# Patient Record
Sex: FEMALE | Race: BLACK OR AFRICAN AMERICAN | NOT HISPANIC OR LATINO | Employment: OTHER | URBAN - METROPOLITAN AREA
[De-identification: names, ages, dates, MRNs, and addresses within clinical notes are randomized per-mention and may not be internally consistent; named-entity substitution may affect disease eponyms.]

---

## 2017-06-02 ENCOUNTER — HOSPITAL ENCOUNTER (EMERGENCY)
Facility: HOSPITAL | Age: 52
Discharge: HOME | End: 2017-06-02
Attending: EMERGENCY MEDICINE
Payer: MEDICAID

## 2017-06-02 VITALS
HEART RATE: 60 BPM | OXYGEN SATURATION: 95 % | TEMPERATURE: 98 F | BODY MASS INDEX: 38.8 KG/M2 | DIASTOLIC BLOOD PRESSURE: 59 MMHG | WEIGHT: 262 LBS | HEIGHT: 69 IN | SYSTOLIC BLOOD PRESSURE: 106 MMHG | RESPIRATION RATE: 18 BRPM

## 2017-06-02 DIAGNOSIS — M54.41 ACUTE RIGHT-SIDED LOW BACK PAIN WITH RIGHT-SIDED SCIATICA: Primary | ICD-10-CM

## 2017-06-02 DIAGNOSIS — M17.11 ARTHRITIS OF RIGHT KNEE: ICD-10-CM

## 2017-06-02 PROCEDURE — 63600175 PHARM REV CODE 636 W HCPCS: Performed by: EMERGENCY MEDICINE

## 2017-06-02 PROCEDURE — 96372 THER/PROPH/DIAG INJ SC/IM: CPT

## 2017-06-02 PROCEDURE — 99283 EMERGENCY DEPT VISIT LOW MDM: CPT | Mod: 25

## 2017-06-02 PROCEDURE — 99284 EMERGENCY DEPT VISIT MOD MDM: CPT | Mod: ,,, | Performed by: EMERGENCY MEDICINE

## 2017-06-02 PROCEDURE — 25000003 PHARM REV CODE 250: Performed by: EMERGENCY MEDICINE

## 2017-06-02 RX ORDER — DIAZEPAM 2 MG/1
2 TABLET ORAL
Status: COMPLETED | OUTPATIENT
Start: 2017-06-02 | End: 2017-06-02

## 2017-06-02 RX ORDER — GABAPENTIN 100 MG/1
100 CAPSULE ORAL 3 TIMES DAILY
COMMUNITY
End: 2023-07-29

## 2017-06-02 RX ORDER — MELOXICAM 15 MG/1
15 TABLET ORAL DAILY
Qty: 10 TABLET | Refills: 0 | Status: ON HOLD | OUTPATIENT
Start: 2017-06-02 | End: 2019-04-08 | Stop reason: HOSPADM

## 2017-06-02 RX ORDER — KETOROLAC TROMETHAMINE 30 MG/ML
10 INJECTION, SOLUTION INTRAMUSCULAR; INTRAVENOUS
Status: COMPLETED | OUTPATIENT
Start: 2017-06-02 | End: 2017-06-02

## 2017-06-02 RX ORDER — CYCLOBENZAPRINE HCL 10 MG
10 TABLET ORAL 3 TIMES DAILY PRN
Qty: 15 TABLET | Refills: 0 | Status: SHIPPED | OUTPATIENT
Start: 2017-06-02 | End: 2017-06-07

## 2017-06-02 RX ADMIN — DIAZEPAM 2 MG: 2 TABLET ORAL at 11:06

## 2017-06-02 RX ADMIN — KETOROLAC TROMETHAMINE 10 MG: 30 INJECTION, SOLUTION INTRAMUSCULAR at 11:06

## 2017-06-02 NOTE — ED PROVIDER NOTES
Encounter Date: 2017    SCRIBE #1 NOTE: I, Daria Terrazas, am scribing for, and in the presence of,  Dr. Farris. I have scribed the entire note.       History     Chief Complaint   Patient presents with    Knee Pain     Review of patient's allergies indicates:  No Known Allergies  Time patient was seen by the provider: 11:16 AM      The patient is a 51 y.o. female with hx of: arthritis, DM, and HLD that presents to the ED with a complaint of exacerbation of intermittent, chronic right-sided lumbar back pain that radiates down the right leg to the knee which began last night. She denies any unusual exertion or trauma prior to onset of pain and cannot lay on the right side secondary to pain. She denies any numbness in the right lower extremity or urinary/bowel incontinence. Patient has an appointment with her PCP in 6 days and has an appointment in several weeks at [facility] with orthopedics. She was told she may need surgery but states she is going to try physical therapy first. Patient takes Percocet  twice daily for pain but recently ran out. She endorses taking naprosyn regularly.           Past Medical History:   Diagnosis Date    Arthritis     Diabetes mellitus     boderline    High cholesterol      Past Surgical History:   Procedure Laterality Date     SECTION, LOW TRANSVERSE       Family History   Problem Relation Age of Onset    Cancer Mother     Diabetes Mother      Social History   Substance Use Topics    Smoking status: Current Every Day Smoker     Packs/day: 0.25    Smokeless tobacco: Not on file    Alcohol use No      Comment: social     Review of Systems   Gastrointestinal:        Negative for bowel incontinence   Genitourinary:        Negative for urinary incontinence   Musculoskeletal: Positive for back pain (right lumbar).        Positive for right knee pain   Skin: Negative for wound.   Neurological: Negative for numbness.       Physical Exam     Initial Vitals [17 1045]    BP Pulse Resp Temp SpO2   (!) 153/66 78 15 98 °F (36.7 °C) 98 %     Physical Exam    Nursing note and vitals reviewed.  Constitutional: She appears well-developed and well-nourished. She is not diaphoretic. No distress.   Cardiovascular:   Pulses:       Dorsalis pedis pulses are 2+ on the right side, and 2+ on the left side.   Musculoskeletal:   Some paralumbar tenderness. No spinal tenderness. Positive straight leg raise at 30 degrees. Right knee with no effusion. Patient is able to bend right knee to 90 degrees with pain; however, pain seems more related to back than knee.    Neurological: She is alert and oriented to person, place, and time. She has normal strength and normal reflexes.   Skin: Skin is warm and dry. No rash noted. No erythema.         ED Course   Procedures  Labs Reviewed - No data to display          Medical Decision Making:   History:   Old Medical Records: I decided to obtain old medical records.  Old Records Summarized: other records.       <> Summary of Records: Patient with a history of arthritis, DM, HLD previously seen in ED multiple times for right sided knee pain secondary to arthritis.  Initial Assessment:   Patient with right lower back pain radiating to right leg and knee consistent with sciatica. No signs of cauda equina. Patient is neurologically intact. Will treat with NSAIDs and muscle relaxants. Patient advised to follow up with PCP. Return instructions given.             Scribe Attestation:   Scribe #1: I performed the above scribed service and the documentation accurately describes the services I performed. I attest to the accuracy of the note.    Attending Attestation:           Physician Attestation for Scribe:  Physician Attestation Statement for Scribe #1: I, Dr. Farris, reviewed documentation, as scribed by Daria Terrazas in my presence, and it is both accurate and complete.                 ED Course     Clinical Impression:   The primary encounter diagnosis was Acute  right-sided low back pain with right-sided sciatica. A diagnosis of Arthritis of right knee was also pertinent to this visit.    Disposition:   Disposition: Discharged  Condition: Stable       Ami Farris MD  06/10/17 1947

## 2017-06-02 NOTE — ED TRIAGE NOTES
C/o right leg, knee, and buttock pain since last night. Hx of chronic pain to site. Reports has been seen before for complaint but unable to get in with PCP.

## 2019-01-26 ENCOUNTER — HOSPITAL ENCOUNTER (EMERGENCY)
Facility: HOSPITAL | Age: 54
Discharge: HOME | End: 2019-01-26
Attending: EMERGENCY MEDICINE
Payer: MEDICAID

## 2019-01-26 VITALS
OXYGEN SATURATION: 100 % | WEIGHT: 265 LBS | HEART RATE: 86 BPM | SYSTOLIC BLOOD PRESSURE: 152 MMHG | BODY MASS INDEX: 35.89 KG/M2 | TEMPERATURE: 99 F | DIASTOLIC BLOOD PRESSURE: 72 MMHG | RESPIRATION RATE: 18 BRPM | HEIGHT: 72 IN

## 2019-01-26 DIAGNOSIS — S83.91XA SPRAIN OF RIGHT KNEE, UNSPECIFIED LIGAMENT, INITIAL ENCOUNTER: Primary | ICD-10-CM

## 2019-01-26 DIAGNOSIS — M79.604 RIGHT LEG PAIN: ICD-10-CM

## 2019-01-26 DIAGNOSIS — M25.571 RIGHT ANKLE PAIN: ICD-10-CM

## 2019-01-26 DIAGNOSIS — M79.661 RIGHT CALF PAIN: ICD-10-CM

## 2019-01-26 DIAGNOSIS — M25.561 RIGHT KNEE PAIN: ICD-10-CM

## 2019-01-26 LAB — POCT GLUCOSE: 194 MG/DL (ref 70–110)

## 2019-01-26 PROCEDURE — 29505 APPLICATION LONG LEG SPLINT: CPT

## 2019-01-26 PROCEDURE — 63600175 PHARM REV CODE 636 W HCPCS: Mod: ER | Performed by: NURSE PRACTITIONER

## 2019-01-26 PROCEDURE — 96372 THER/PROPH/DIAG INJ SC/IM: CPT | Mod: ER

## 2019-01-26 PROCEDURE — 99284 EMERGENCY DEPT VISIT MOD MDM: CPT | Mod: 25,ER

## 2019-01-26 PROCEDURE — 25000003 PHARM REV CODE 250: Mod: ER | Performed by: NURSE PRACTITIONER

## 2019-01-26 PROCEDURE — 82962 GLUCOSE BLOOD TEST: CPT | Mod: ER

## 2019-01-26 RX ORDER — ACETAMINOPHEN AND CODEINE PHOSPHATE 300; 30 MG/1; MG/1
1 TABLET ORAL
Status: COMPLETED | OUTPATIENT
Start: 2019-01-26 | End: 2019-01-26

## 2019-01-26 RX ORDER — KETOROLAC TROMETHAMINE 30 MG/ML
15 INJECTION, SOLUTION INTRAMUSCULAR; INTRAVENOUS
Status: COMPLETED | OUTPATIENT
Start: 2019-01-26 | End: 2019-01-26

## 2019-01-26 RX ORDER — IBUPROFEN 600 MG/1
600 TABLET ORAL EVERY 6 HOURS PRN
Qty: 20 TABLET | Refills: 0 | Status: ON HOLD | OUTPATIENT
Start: 2019-01-26 | End: 2019-04-08 | Stop reason: HOSPADM

## 2019-01-26 RX ORDER — METHOCARBAMOL 750 MG/1
1500 TABLET, FILM COATED ORAL EVERY 8 HOURS PRN
Qty: 30 TABLET | Refills: 0 | Status: SHIPPED | OUTPATIENT
Start: 2019-01-26

## 2019-01-26 RX ADMIN — KETOROLAC TROMETHAMINE 15 MG: 30 INJECTION, SOLUTION INTRAMUSCULAR at 01:01

## 2019-01-26 RX ADMIN — ACETAMINOPHEN AND CODEINE PHOSPHATE 1 TABLET: 300; 30 TABLET ORAL at 01:01

## 2019-01-26 NOTE — ED PROVIDER NOTES
Encounter Date: 2019    SCRIBE #1 NOTE: I, Araceli Gan, am scribing for, and in the presence of,  Toussaintussaint Battley NP. I have scribed the following portions of the note - Other sections scribed: HPI, ROS, PE.       History     Chief Complaint   Patient presents with    Leg Pain     patient reports she fell and twisted her right leg a couple of hours ago     53 y.o female with DM presents with right leg pain after she twisted her leg at home earlier today. She is unable to bear weight and has swelling in her knee and ankle. She has a hx of arthritis in the leg. She denies any head trauma, numbness, or weakness.       The history is provided by the patient. No  was used.     Review of patient's allergies indicates:  No Known Allergies  Past Medical History:   Diagnosis Date    Arthritis     Diabetes mellitus     boderline    High cholesterol      Past Surgical History:   Procedure Laterality Date     SECTION, LOW TRANSVERSE       Family History   Problem Relation Age of Onset    Cancer Mother     Diabetes Mother      Social History     Tobacco Use    Smoking status: Current Every Day Smoker     Packs/day: 0.25    Smokeless tobacco: Never Used   Substance Use Topics    Alcohol use: No     Comment: social    Drug use: No     Review of Systems   Constitutional: Negative.  Negative for appetite change, chills, diaphoresis and fever.   HENT: Negative.  Negative for congestion, dental problem, postnasal drip, sinus pressure, sinus pain and sore throat.    Eyes: Negative.  Negative for pain, discharge, redness and itching.   Respiratory: Negative.  Negative for cough, shortness of breath and wheezing.    Cardiovascular: Negative.  Negative for chest pain and palpitations.   Gastrointestinal: Negative.  Negative for abdominal pain, constipation, diarrhea, nausea and vomiting.   Genitourinary: Negative.  Negative for difficulty urinating, dysuria, flank pain, menstrual problem,  vaginal bleeding, vaginal discharge and vaginal pain.   Musculoskeletal: Positive for arthralgias (right knee, ankle, and calf), gait problem and joint swelling (right knee and ankle). Negative for back pain and neck pain.   Skin: Negative.  Negative for rash.   Allergic/Immunologic: Negative.    Neurological: Negative for dizziness, syncope, weakness, light-headedness, numbness and headaches.   Psychiatric/Behavioral: Negative.  Negative for hallucinations, self-injury and suicidal ideas.   All other systems reviewed and are negative.      Physical Exam     Initial Vitals [01/26/19 1251]   BP Pulse Resp Temp SpO2   (!) 157/77 91 18 98.6 °F (37 °C) 100 %      MAP       --         Physical Exam    Nursing note and vitals reviewed.  Constitutional: She appears well-developed and well-nourished. She is not diaphoretic.  Non-toxic appearance. She does not appear ill. No distress.   HENT:   Head: Normocephalic and atraumatic.   Eyes: Conjunctivae and EOM are normal. Right eye exhibits no discharge. Left eye exhibits no discharge.   Neck: Normal range of motion.   Cardiovascular: Normal rate, regular rhythm, normal heart sounds and intact distal pulses. Exam reveals no gallop and no friction rub.    No murmur heard.  Pulmonary/Chest: Breath sounds normal. No respiratory distress. She has no wheezes. She has no rhonchi. She has no rales. She exhibits no tenderness.   Abdominal: Soft.   Musculoskeletal: Normal range of motion.        Right knee: She exhibits swelling.        Right ankle: She exhibits swelling. She exhibits no ecchymosis, no deformity, no laceration and normal pulse. Tenderness.        Right lower leg: She exhibits tenderness. She exhibits no bony tenderness, no swelling, no edema, no deformity and no laceration.        Legs:  Neurological: She is alert and oriented to person, place, and time. No cranial nerve deficit or sensory deficit.   Skin: Skin is warm and dry. Capillary refill takes less than 2  seconds. No rash noted.   Psychiatric: She has a normal mood and affect. Her behavior is normal. Judgment and thought content normal.         ED Course   Procedures  Labs Reviewed   POCT GLUCOSE - Abnormal; Notable for the following components:       Result Value    POCT Glucose 194 (*)     All other components within normal limits   POCT GLUCOSE, HAND-HELD DEVICE          Imaging Results          US Lower Extremity Veins Right (In process)                X-Ray Knee 3 View Right (Final result)  Result time 01/26/19 13:51:53   Procedure changed from X-Ray Knee 3 View Left     Final result by Dwayne Banks MD (01/26/19 13:51:53)                 Impression:      1. Small suprapatellar effusion, no acute displaced fracture or dislocation of the knee.      Electronically signed by: Dwayne Banks MD  Date:    01/26/2019  Time:    13:51             Narrative:    EXAMINATION:  XR KNEE 3 VIEW RIGHT    CLINICAL HISTORY:  pain; Pain in right knee    TECHNIQUE:  AP, lateral, and Merchant views of the right knee were performed.    COMPARISON:  10/11/2014    FINDINGS:  Three views.    Degenerative changes are noted of the knee.  No acute displaced fracture or dislocation of the knee.  There is a small suprapatellar effusion.                               X-Ray Ankle Complete Right (Final result)  Result time 01/26/19 13:52:41   Procedure changed from X-Ray Ankle Complete Left     Final result by Dwayne Banks MD (01/26/19 13:52:41)                 Impression:      1. No acute displaced fracture or dislocation of the ankle.      Electronically signed by: Dwayne Banks MD  Date:    01/26/2019  Time:    13:52             Narrative:    EXAMINATION:  XR ANKLE COMPLETE 3 VIEW RIGHT    CLINICAL HISTORY:  pain; Pain in right ankle and joints of right foot    TECHNIQUE:  AP, lateral, and oblique images of the right ankle were performed.    COMPARISON:  None    FINDINGS:  Three views.    Degenerative changes are noted of the  ankle.  The ankle mortise is intact.  No acute displaced fracture or dislocation of the ankle.  No radiopaque foreign body.                               X-Ray Tibia Fibula 2 View Right (Final result)  Result time 01/26/19 13:52:14   Procedure changed from X-Ray Tibia Fibula 2 View Left     Final result by Dwayne Banks MD (01/26/19 13:52:14)                 Impression:      As above      Electronically signed by: Dwayne Banks MD  Date:    01/26/2019  Time:    13:52             Narrative:    EXAMINATION:  XR TIBIA FIBULA 2 VIEW RIGHT    CLINICAL HISTORY:  pain;  Pain in right leg    TECHNIQUE:  AP and lateral views of the right tibia and fibula were performed.    COMPARISON:  None.    FINDINGS:  Two views.    No acute displaced fracture or dislocation of the tibia or fibula.  No radiopaque foreign body.                                 Medical Decision Making:   Initial Assessment:   Leg spasm, knee sprain, pain in leg and calf  Differential Diagnosis:   Fracture, dislocation, DVT  Clinical Tests:   Lab Tests: Ordered and Reviewed  Radiological Study: Ordered and Reviewed  ED Management:  Tylenol No.  3, Toradol IM and Norflex p.o. given in the ER.  Patient will be discharged home with knee immobilizer, crutches, Robaxin and ibuprofen.  Patient is instructed to implement RICE, follow up with her primary care provider in 2 days, return to the ER as needed if symptoms worsen or fail to improve.  Patient verbalized understanding of discharge instruction treatment plan.            Scribe Attestation:   Scribe #1: I performed the above scribed service and the documentation accurately describes the services I performed. I attest to the accuracy of the note.               Clinical Impression:     1. Sprain of right knee, unspecified ligament, initial encounter    2. Right calf pain    3. Right leg pain    4. Right knee pain    5. Right ankle pain                                 Toussaint Battley III, CRYSTAL  01/26/19  0343

## 2019-04-07 ENCOUNTER — HOSPITAL ENCOUNTER (OUTPATIENT)
Facility: HOSPITAL | Age: 54
LOS: 1 days | Discharge: HOME | End: 2019-04-08
Attending: EMERGENCY MEDICINE | Admitting: HOSPITALIST
Payer: MEDICAID

## 2019-04-07 DIAGNOSIS — R06.02 SHORTNESS OF BREATH: ICD-10-CM

## 2019-04-07 DIAGNOSIS — I21.4 NSTEMI (NON-ST ELEVATED MYOCARDIAL INFARCTION): ICD-10-CM

## 2019-04-07 DIAGNOSIS — I50.9 ACUTE CONGESTIVE HEART FAILURE, UNSPECIFIED HEART FAILURE TYPE: ICD-10-CM

## 2019-04-07 DIAGNOSIS — R79.89 ELEVATED TROPONIN: Primary | ICD-10-CM

## 2019-04-07 DIAGNOSIS — R06.02 SOB (SHORTNESS OF BREATH): ICD-10-CM

## 2019-04-07 DIAGNOSIS — F14.10 COCAINE ABUSE: ICD-10-CM

## 2019-04-07 DIAGNOSIS — I25.10 CAD (CORONARY ARTERY DISEASE): ICD-10-CM

## 2019-04-07 PROBLEM — Z72.0 TOBACCO ABUSE: Status: ACTIVE | Noted: 2019-04-07

## 2019-04-07 PROBLEM — E78.5 HYPERLIPIDEMIA: Status: ACTIVE | Noted: 2019-04-07

## 2019-04-07 PROBLEM — I50.23 ACUTE ON CHRONIC SYSTOLIC (CONGESTIVE) HEART FAILURE: Status: ACTIVE | Noted: 2019-04-07

## 2019-04-07 PROBLEM — I10 ESSENTIAL HYPERTENSION: Status: ACTIVE | Noted: 2019-04-07

## 2019-04-07 PROBLEM — E11.9 DIABETES MELLITUS, TYPE 2: Status: ACTIVE | Noted: 2019-04-07

## 2019-04-07 LAB
ALBUMIN SERPL BCP-MCNC: 3.8 G/DL (ref 3.5–5.2)
ALBUMIN SERPL-MCNC: 3.3 G/DL
ALP SERPL-CCNC: 78 U/L
ALP SERPL-CCNC: 89 U/L (ref 55–135)
ALT SERPL W/O P-5'-P-CCNC: 15 U/L (ref 10–44)
AMPHET+METHAMPHET UR QL: NEGATIVE
ANION GAP SERPL CALC-SCNC: 6 MMOL/L (ref 8–16)
AST SERPL-CCNC: 14 U/L (ref 10–40)
BARBITURATES UR QL SCN>200 NG/ML: NEGATIVE
BASOPHILS # BLD AUTO: 0.01 K/UL (ref 0–0.2)
BASOPHILS NFR BLD: 0.2 % (ref 0–1.9)
BENZODIAZ UR QL SCN>200 NG/ML: NEGATIVE
BILIRUB SERPL-MCNC: 0.3 MG/DL (ref 0.1–1)
BILIRUB SERPL-MCNC: 0.6 MG/DL
BUN SERPL-MCNC: 13 MG/DL
BUN SERPL-MCNC: 14 MG/DL (ref 6–20)
BZE UR QL SCN: NORMAL
CALCIUM SERPL-MCNC: 9.3 MG/DL (ref 8.7–10.5)
CALCIUM SERPL-MCNC: 9.6 MG/DL
CANNABINOIDS UR QL SCN: NEGATIVE
CHLORIDE SERPL-SCNC: 109 MMOL/L (ref 95–110)
CHLORIDE SERPL-SCNC: 110 MMOL/L
CHOLEST SERPL-MCNC: 256 MG/DL (ref 120–199)
CHOLEST/HDLC SERPL: 4.8 {RATIO} (ref 2–5)
CO2 SERPL-SCNC: 30 MMOL/L (ref 23–29)
CREAT SERPL-MCNC: 0.9 MG/DL
CREAT SERPL-MCNC: 0.9 MG/DL (ref 0.5–1.4)
CREAT UR-MCNC: 36.3 MG/DL (ref 15–325)
DIFFERENTIAL METHOD: ABNORMAL
EOSINOPHIL # BLD AUTO: 0 K/UL (ref 0–0.5)
EOSINOPHIL NFR BLD: 0.8 % (ref 0–8)
ERYTHROCYTE [DISTWIDTH] IN BLOOD BY AUTOMATED COUNT: 13.5 % (ref 11.5–14.5)
EST. GFR  (AFRICAN AMERICAN): >60 ML/MIN/1.73 M^2
EST. GFR  (NON AFRICAN AMERICAN): >60 ML/MIN/1.73 M^2
GLUCOSE SERPL-MCNC: 162 MG/DL (ref 70–110)
GLUCOSE SERPL-MCNC: 182 MG/DL (ref 70–110)
HCT VFR BLD AUTO: 34.6 % (ref 37–48.5)
HDLC SERPL-MCNC: 53 MG/DL (ref 40–75)
HDLC SERPL: 20.7 % (ref 20–50)
HGB BLD-MCNC: 11.6 G/DL (ref 12–16)
LDLC SERPL CALC-MCNC: 178.6 MG/DL (ref 63–159)
LYMPHOCYTES # BLD AUTO: 2.4 K/UL (ref 1–4.8)
LYMPHOCYTES NFR BLD: 45.9 % (ref 18–48)
MCH RBC QN AUTO: 29.5 PG (ref 27–31)
MCHC RBC AUTO-ENTMCNC: 33.5 G/DL (ref 32–36)
MCV RBC AUTO: 88 FL (ref 82–98)
METHADONE UR QL SCN>300 NG/ML: NEGATIVE
MONOCYTES # BLD AUTO: 0.3 K/UL (ref 0.3–1)
MONOCYTES NFR BLD: 6.1 % (ref 4–15)
NEUTROPHILS # BLD AUTO: 2.4 K/UL (ref 1.8–7.7)
NEUTROPHILS NFR BLD: 47 % (ref 38–73)
NONHDLC SERPL-MCNC: 203 MG/DL
OPIATES UR QL SCN: NEGATIVE
PCP UR QL SCN>25 NG/ML: NEGATIVE
PLATELET # BLD AUTO: 267 K/UL (ref 150–350)
PMV BLD AUTO: 9.6 FL (ref 9.2–12.9)
POC ALT (SGPT): 28 U/L
POC AST (SGOT): 24 U/L
POC B-TYPE NATRIURETIC PEPTIDE: 434 PG/ML
POC CARDIAC TROPONIN I: 0.18 NG/ML
POC CARDIAC TROPONIN I: 0.18 NG/ML
POC D-DI: 1090 NG/ML
POC TCO2: 29 MMOL/L
POCT GLUCOSE: 165 MG/DL (ref 70–110)
POCT GLUCOSE: 172 MG/DL (ref 70–110)
POTASSIUM BLD-SCNC: 3.5 MMOL/L
POTASSIUM SERPL-SCNC: 3.5 MMOL/L (ref 3.5–5.1)
PROT SERPL-MCNC: 7 G/DL (ref 6–8.4)
PROTEIN, POC: 7.1 G/DL
RBC # BLD AUTO: 3.93 M/UL (ref 4–5.4)
SAMPLE: ABNORMAL
SAMPLE: ABNORMAL
SODIUM BLD-SCNC: 149 MMOL/L
SODIUM SERPL-SCNC: 145 MMOL/L (ref 136–145)
TOXICOLOGY INFORMATION: NORMAL
TRIGL SERPL-MCNC: 122 MG/DL (ref 30–150)
TROPONIN I SERPL DL<=0.01 NG/ML-MCNC: 0.2 NG/ML (ref 0–0.03)
TROPONIN I SERPL DL<=0.01 NG/ML-MCNC: 0.21 NG/ML (ref 0–0.03)
WBC # BLD AUTO: 5.12 K/UL (ref 3.9–12.7)

## 2019-04-07 PROCEDURE — 25000242 PHARM REV CODE 250 ALT 637 W/ HCPCS: Mod: ER | Performed by: EMERGENCY MEDICINE

## 2019-04-07 PROCEDURE — 85025 COMPLETE CBC W/AUTO DIFF WBC: CPT | Mod: ER

## 2019-04-07 PROCEDURE — 83880 ASSAY OF NATRIURETIC PEPTIDE: CPT | Mod: ER

## 2019-04-07 PROCEDURE — 80053 COMPREHEN METABOLIC PANEL: CPT

## 2019-04-07 PROCEDURE — 84484 ASSAY OF TROPONIN QUANT: CPT | Mod: ER

## 2019-04-07 PROCEDURE — 94640 AIRWAY INHALATION TREATMENT: CPT | Mod: ER

## 2019-04-07 PROCEDURE — 84484 ASSAY OF TROPONIN QUANT: CPT | Mod: 91

## 2019-04-07 PROCEDURE — 80053 COMPREHEN METABOLIC PANEL: CPT | Mod: ER

## 2019-04-07 PROCEDURE — 85025 COMPLETE CBC W/AUTO DIFF WBC: CPT

## 2019-04-07 PROCEDURE — 85379 FIBRIN DEGRADATION QUANT: CPT | Mod: ER

## 2019-04-07 PROCEDURE — 80307 DRUG TEST PRSMV CHEM ANLYZR: CPT

## 2019-04-07 PROCEDURE — 93005 ELECTROCARDIOGRAM TRACING: CPT | Mod: ER

## 2019-04-07 PROCEDURE — 99291 CRITICAL CARE FIRST HOUR: CPT | Mod: 25,ER

## 2019-04-07 PROCEDURE — 63600175 PHARM REV CODE 636 W HCPCS: Performed by: PHYSICIAN ASSISTANT

## 2019-04-07 PROCEDURE — G0378 HOSPITAL OBSERVATION PER HR: HCPCS

## 2019-04-07 PROCEDURE — 82962 GLUCOSE BLOOD TEST: CPT | Mod: ER

## 2019-04-07 PROCEDURE — 25000003 PHARM REV CODE 250: Mod: ER | Performed by: EMERGENCY MEDICINE

## 2019-04-07 PROCEDURE — 83036 HEMOGLOBIN GLYCOSYLATED A1C: CPT

## 2019-04-07 PROCEDURE — 80061 LIPID PANEL: CPT

## 2019-04-07 PROCEDURE — 96374 THER/PROPH/DIAG INJ IV PUSH: CPT | Mod: ER,59

## 2019-04-07 PROCEDURE — 63600175 PHARM REV CODE 636 W HCPCS: Mod: ER | Performed by: EMERGENCY MEDICINE

## 2019-04-07 PROCEDURE — 93010 EKG 12-LEAD: ICD-10-PCS | Mod: ,,, | Performed by: INTERNAL MEDICINE

## 2019-04-07 PROCEDURE — 94760 N-INVAS EAR/PLS OXIMETRY 1: CPT | Mod: ER

## 2019-04-07 PROCEDURE — 93010 ELECTROCARDIOGRAM REPORT: CPT | Mod: ,,, | Performed by: INTERNAL MEDICINE

## 2019-04-07 PROCEDURE — 36415 COLL VENOUS BLD VENIPUNCTURE: CPT

## 2019-04-07 PROCEDURE — 25500020 PHARM REV CODE 255: Mod: ER | Performed by: EMERGENCY MEDICINE

## 2019-04-07 RX ORDER — CLONIDINE HYDROCHLORIDE 0.1 MG/1
0.1 TABLET ORAL EVERY 6 HOURS PRN
Status: DISCONTINUED | OUTPATIENT
Start: 2019-04-07 | End: 2019-04-08 | Stop reason: HOSPADM

## 2019-04-07 RX ORDER — NAPROXEN SODIUM 220 MG/1
81 TABLET, FILM COATED ORAL DAILY
Status: DISCONTINUED | OUTPATIENT
Start: 2019-04-08 | End: 2019-04-08 | Stop reason: HOSPADM

## 2019-04-07 RX ORDER — GLUCAGON 1 MG
1 KIT INJECTION
Status: DISCONTINUED | OUTPATIENT
Start: 2019-04-07 | End: 2019-04-08 | Stop reason: HOSPADM

## 2019-04-07 RX ORDER — ATORVASTATIN CALCIUM 40 MG/1
80 TABLET, FILM COATED ORAL DAILY
Status: DISCONTINUED | OUTPATIENT
Start: 2019-04-08 | End: 2019-04-08 | Stop reason: HOSPADM

## 2019-04-07 RX ORDER — IBUPROFEN 200 MG
16 TABLET ORAL
Status: DISCONTINUED | OUTPATIENT
Start: 2019-04-07 | End: 2019-04-08 | Stop reason: HOSPADM

## 2019-04-07 RX ORDER — ENOXAPARIN SODIUM 150 MG/ML
1 INJECTION SUBCUTANEOUS
Status: DISCONTINUED | OUTPATIENT
Start: 2019-04-07 | End: 2019-04-07

## 2019-04-07 RX ORDER — SODIUM CHLORIDE 0.9 % (FLUSH) 0.9 %
10 SYRINGE (ML) INJECTION
Status: DISCONTINUED | OUTPATIENT
Start: 2019-04-07 | End: 2019-04-08 | Stop reason: HOSPADM

## 2019-04-07 RX ORDER — LISINOPRIL 5 MG/1
5 TABLET ORAL DAILY
Status: DISCONTINUED | OUTPATIENT
Start: 2019-04-08 | End: 2019-04-08 | Stop reason: HOSPADM

## 2019-04-07 RX ORDER — INSULIN ASPART 100 [IU]/ML
0-5 INJECTION, SOLUTION INTRAVENOUS; SUBCUTANEOUS
Status: DISCONTINUED | OUTPATIENT
Start: 2019-04-07 | End: 2019-04-08 | Stop reason: HOSPADM

## 2019-04-07 RX ORDER — IPRATROPIUM BROMIDE AND ALBUTEROL SULFATE 2.5; .5 MG/3ML; MG/3ML
3 SOLUTION RESPIRATORY (INHALATION)
Status: COMPLETED | OUTPATIENT
Start: 2019-04-07 | End: 2019-04-07

## 2019-04-07 RX ORDER — IBUPROFEN 200 MG
24 TABLET ORAL
Status: DISCONTINUED | OUTPATIENT
Start: 2019-04-07 | End: 2019-04-08 | Stop reason: HOSPADM

## 2019-04-07 RX ORDER — RAMELTEON 8 MG/1
8 TABLET ORAL NIGHTLY PRN
Status: DISCONTINUED | OUTPATIENT
Start: 2019-04-07 | End: 2019-04-08 | Stop reason: HOSPADM

## 2019-04-07 RX ORDER — ASPIRIN 325 MG
325 TABLET ORAL
Status: COMPLETED | OUTPATIENT
Start: 2019-04-07 | End: 2019-04-07

## 2019-04-07 RX ORDER — FUROSEMIDE 10 MG/ML
40 INJECTION INTRAMUSCULAR; INTRAVENOUS
Status: COMPLETED | OUTPATIENT
Start: 2019-04-07 | End: 2019-04-07

## 2019-04-07 RX ORDER — CLOPIDOGREL BISULFATE 75 MG/1
75 TABLET ORAL DAILY
Status: DISCONTINUED | OUTPATIENT
Start: 2019-04-08 | End: 2019-04-08 | Stop reason: HOSPADM

## 2019-04-07 RX ORDER — ACETAMINOPHEN 500 MG
500 TABLET ORAL EVERY 6 HOURS PRN
Status: DISCONTINUED | OUTPATIENT
Start: 2019-04-07 | End: 2019-04-08 | Stop reason: HOSPADM

## 2019-04-07 RX ORDER — ENOXAPARIN SODIUM 100 MG/ML
40 INJECTION SUBCUTANEOUS EVERY 24 HOURS
Status: DISCONTINUED | OUTPATIENT
Start: 2019-04-07 | End: 2019-04-08

## 2019-04-07 RX ORDER — ONDANSETRON 4 MG/1
4 TABLET, ORALLY DISINTEGRATING ORAL EVERY 6 HOURS PRN
Status: DISCONTINUED | OUTPATIENT
Start: 2019-04-07 | End: 2019-04-08 | Stop reason: HOSPADM

## 2019-04-07 RX ORDER — FUROSEMIDE 10 MG/ML
40 INJECTION INTRAMUSCULAR; INTRAVENOUS 2 TIMES DAILY
Status: DISCONTINUED | OUTPATIENT
Start: 2019-04-08 | End: 2019-04-08 | Stop reason: HOSPADM

## 2019-04-07 RX ORDER — NITROGLYCERIN 0.4 MG/1
0.4 TABLET SUBLINGUAL EVERY 5 MIN PRN
Status: DISCONTINUED | OUTPATIENT
Start: 2019-04-07 | End: 2019-04-08 | Stop reason: HOSPADM

## 2019-04-07 RX ADMIN — FUROSEMIDE 40 MG: 10 INJECTION, SOLUTION INTRAVENOUS at 01:04

## 2019-04-07 RX ADMIN — IOHEXOL 100 ML: 350 INJECTION, SOLUTION INTRAVENOUS at 01:04

## 2019-04-07 RX ADMIN — ASPIRIN 325 MG ORAL TABLET 325 MG: 325 PILL ORAL at 12:04

## 2019-04-07 RX ADMIN — ENOXAPARIN SODIUM 40 MG: 100 INJECTION SUBCUTANEOUS at 06:04

## 2019-04-07 RX ADMIN — IPRATROPIUM BROMIDE AND ALBUTEROL SULFATE 3 ML: .5; 3 SOLUTION RESPIRATORY (INHALATION) at 12:04

## 2019-04-07 NOTE — HPI
"Jade Zuñiga 53 y.o. female with CAD, HTN, HLD, DM2, and cocaine abuse presents to the hospital with a chief complaint of SOB. She reports she awoke this morning with severe SOB. She had been noting over the last week increasing SOB, but today became unbearable causing presentation to the hospital. The SOB is worsened with walking and laying flat. She reports laying flat she felt as though she was smothering. Her SOB is improved now after lasix provided by the ED. She noticed she has been gaining weight and contributed that to the SOB. She also has been eating fried chicken and feels that played a role. She reports she stopped cocaine use after previous MI, however, used cocaine 2 days ago. These symptoms do not feel similar to previous MI. She is compliant with aspirin, plavix, statin, and coreg. She is unsure of if she takes other BP medications. She endorses SOB and orthopnea. She denies fever, chest pain, leg swelling, N/V/D, abdominal pain, dysuria, and syncope. She also reports she smokes 3 cigarettes a day.     [facility] chart review  8/2018 "08/2018 PATIENT ADMITTED, DIAGNOSED WITH ACUTE ANTERIOR STEMI WITH CARDIOGENIC SHOCK. URINE FOUND POSITIVE FOR COCAINE. PATIENT WITH VENTRICULAR ECTOPY, HYPOKALEMIA. SHE WAS FEBRILE. GPC BETA HEMOLYTIC STREP AND STAPH AUREUS BACTEREMIA. DORYS NEGATIVE FOR ENDOCARDITIS. SHE UNDERWENT LEFT HEART CATH WITH PTCA AND STENTING TO LAD AND RCA. BARE METAL WAS PLACED TO THE LAD AND DRUG ELUTING STENT TO THE RCA. SHE HAD RIGHT LEG PAIN. SHE DID HAVE A BALLOON PUMP IN PLACE. THEY DID DO A CT SCAN TO EVALUATE FOR INFECTION WHICH WAS NEGATIVE AND A VENOUS ULTRASOUND WHICH WAS NEGATIVE FOR DVT. ECHO REVEALED NO EVIDENCE OF VEGETATION AND EF WAS 45-50%. HEART CATH REVEALED NO SIGNIFICANT DISEASE IN THE LEFT MAIN, TOTALLY OCCLUDED LAD IN THE PROXIMAL PORTION. CIRCUMFLEX NO SIGNIFICANT DISEASE, RCA DOMINANT, TOTAL OCCLUSION IN THE PROXIMAL PORTION.    In the ED, troponin 0.18, EKG of NSR " without ST segment elevation, chest x-ray with signs of edema, D-dimer of 1000, CTA without PE and findings suggesting edema, and BNP of 434.

## 2019-04-07 NOTE — HOSPITAL COURSE
Jade Zuñiga 53 y.o. female admitted to observation for shortness of breath felt due to acute on chronic diastolic heart failure vs NSTEMI. In the ED, troponin 0.18, EKG of NSR without ST segment elevation, chest x-ray with signs of edema, D-dimer of 1000, CTA without PE and findings suggesting edema, and BNP of 434. CT also showed possible nodule versus atelectasis within the left lower lobe and Radiology recommended 1 year follow-up. Urine tox positive for cocaine (last use 2 days ago).  Patient with history of STEMI August 2018 hospitalized at [facility] when patient had stent to RCA and LAD.  Symptoms improved with IV Lasix.  Urine output not all charted in epic.  On 04/08/2019 stress tests showed large amount of severe infarct in the anteroapical walls and inferior walls felt to be old infarcts.  2D showed EF of 55%, grade 3 left ventricular diastolic dysfunction, and wall motion normal. Patient remained stable without chest pain or shortness of breath throughout observation stay.  Patient stable for discharge encouraged patient to stop smoking and using cocaine.  Patient to follow up with primary cardiologist as scheduled.

## 2019-04-07 NOTE — ASSESSMENT & PLAN NOTE
Greater than 3 minutes spent on counseling patient on dangers of continued tobacco abuse. Will provide tobacco cessation education.

## 2019-04-07 NOTE — ASSESSMENT & PLAN NOTE
Likely due to strain from CHF and cocaine use. Patient denies any chest pain, and reports SOB improved with IV lasix. Have remained flat at 0.18. Continue aspirin, statin, and monitor on telemetry  Echo pending  Cardiology consulted

## 2019-04-07 NOTE — ASSESSMENT & PLAN NOTE
Will continue home lisinopril seen in last cardiology note from care everywhere. Holding home coreg until drug screen results. PRN clonidine.

## 2019-04-07 NOTE — H&P
"[facility] Medicine  History & Physical    Patient Name: Jade Zuñiga  MRN: [id number]  Admission Date: 4/7/2019  Attending Physician: Adelina Baeza MD   Primary Care Provider: Santiago Knox MD         Patient information was obtained from patient, past medical records and ER records.     Subjective:     Principal Problem:Acute on chronic systolic (congestive) heart failure    Chief Complaint:   Chief Complaint   Patient presents with    Shortness of Breath     Pt states," Shortness of breath since last night."        HPI: Jade Zuñiga 53 y.o. female with CAD, HTN, HLD, DM2, and cocaine abuse presents to the hospital with a chief complaint of SOB. She reports she awoke this morning with severe SOB. She had been noting over the last week increasing SOB, but today became unbearable causing presentation to the hospital. The SOB is worsened with walking and laying flat. She reports laying flat she felt as though she was smothering. Her SOB is improved now after lasix provided by the ED. She noticed she has been gaining weight and contributed that to the SOB. She also has been eating fried chicken and feels that played a role. She reports she stopped cocaine use after previous MI, however, used cocaine 2 days ago. These symptoms do not feel similar to previous MI. She is compliant with aspirin, plavix, statin, and coreg. She is unsure of if she takes other BP medications. She endorses SOB and orthopnea. She denies fever, chest pain, leg swelling, N/V/D, abdominal pain, dysuria, and syncope. She also reports she smokes 3 cigarettes a day.     [facility] chart review  8/2018 "08/2018 PATIENT ADMITTED, DIAGNOSED WITH ACUTE ANTERIOR STEMI WITH CARDIOGENIC SHOCK. URINE FOUND POSITIVE FOR COCAINE. PATIENT WITH VENTRICULAR ECTOPY, HYPOKALEMIA. SHE WAS FEBRILE. GPC BETA HEMOLYTIC STREP AND STAPH AUREUS BACTEREMIA. DORYS NEGATIVE FOR ENDOCARDITIS. SHE UNDERWENT LEFT HEART CATH WITH PTCA AND " STENTING TO LAD AND RCA. BARE METAL WAS PLACED TO THE LAD AND DRUG ELUTING STENT TO THE RCA. SHE HAD RIGHT LEG PAIN. SHE DID HAVE A BALLOON PUMP IN PLACE. THEY DID DO A CT SCAN TO EVALUATE FOR INFECTION WHICH WAS NEGATIVE AND A VENOUS ULTRASOUND WHICH WAS NEGATIVE FOR DVT. ECHO REVEALED NO EVIDENCE OF VEGETATION AND EF WAS 45-50%. HEART CATH REVEALED NO SIGNIFICANT DISEASE IN THE LEFT MAIN, TOTALLY OCCLUDED LAD IN THE PROXIMAL PORTION. CIRCUMFLEX NO SIGNIFICANT DISEASE, RCA DOMINANT, TOTAL OCCLUSION IN THE PROXIMAL PORTION.    In the ED, troponin 0.18, EKG of NSR without ST segment elevation, chest x-ray with signs of edema, D-dimer of 1000, CTA without PE and findings suggesting edema, and BNP of 434.     Past Medical History:   Diagnosis Date    Arthritis     CHF (congestive heart failure)     Diabetes mellitus     boderline    High cholesterol     MI (myocardial infarction)        Past Surgical History:   Procedure Laterality Date     SECTION, LOW TRANSVERSE         Review of patient's allergies indicates:  No Known Allergies    No current facility-administered medications on file prior to encounter.      Current Outpatient Medications on File Prior to Encounter   Medication Sig    metformin (GLUCOPHAGE-XR) 500 MG 24 hr tablet Take 500 mg by mouth daily with breakfast.    pravastatin (PRAVACHOL) 20 MG tablet Take 20 mg by mouth once daily.    gabapentin (NEURONTIN) 100 MG capsule Take 100 mg by mouth 3 (three) times daily.    ibuprofen (ADVIL,MOTRIN) 600 MG tablet Take 1 tablet (600 mg total) by mouth every 6 (six) hours as needed for Pain. Take with food to prevent stomach ulcer/bleed    lovastatin (MEVACOR) 20 MG tablet Take 20 mg by mouth every evening.    meloxicam (MOBIC) 15 MG tablet Take 1 tablet (15 mg total) by mouth once daily.    methocarbamol (ROBAXIN) 750 MG Tab Take 2 tablets (1,500 mg total) by mouth every 8 (eight) hours as needed (spasms).    oxycodone-acetaminophen  (PERCOCET) 5-325 mg per tablet Take 1 tablet by mouth every 4 (four) hours as needed for Pain.    promethazine-phenylephrine (PROMETHAZINE VC) 6.25-5 mg/5 mL Syrp Take by mouth.     Family History     Problem Relation (Age of Onset)    Cancer Mother    Diabetes Mother        Tobacco Use    Smoking status: Current Every Day Smoker     Packs/day: 0.25    Smokeless tobacco: Never Used   Substance and Sexual Activity    Alcohol use: No     Comment: social    Drug use: No    Sexual activity: Yes     Partners: Male     Review of Systems   Constitutional: Negative for chills and fever.   HENT: Negative for nosebleeds and tinnitus.    Eyes: Negative for photophobia and visual disturbance.   Respiratory: Positive for shortness of breath. Negative for wheezing.         Orthopnea   Cardiovascular: Negative for chest pain, palpitations and leg swelling.   Gastrointestinal: Negative for abdominal distention, abdominal pain, diarrhea, nausea and vomiting.   Genitourinary: Negative for dysuria, flank pain and hematuria.   Musculoskeletal: Negative for gait problem and joint swelling.   Skin: Negative for rash and wound.   Neurological: Negative for seizures and syncope.     Objective:     Vital Signs (Most Recent):  Temp: 98.5 °F (36.9 °C) (04/07/19 1659)  Pulse: 78 (04/07/19 1659)  Resp: 20 (04/07/19 1218)  BP: (!) 157/93 (04/07/19 1659)  SpO2: 99 % (04/07/19 1602) Vital Signs (24h Range):  Temp:  [98 °F (36.7 °C)-98.5 °F (36.9 °C)] 98.5 °F (36.9 °C)  Pulse:  [64-95] 78  Resp:  [18-20] 20  SpO2:  [92 %-100 %] 99 %  BP: (117-204)/() 157/93     Weight: 128.4 kg (283 lb)  Body mass index is 37.34 kg/m².    Physical Exam   Constitutional: She is oriented to person, place, and time. She appears well-developed and well-nourished. No distress.   HENT:   Head: Normocephalic and atraumatic.   Right Ear: External ear normal.   Left Ear: External ear normal.   Eyes: Conjunctivae and EOM are normal. Right eye exhibits no  discharge. Left eye exhibits no discharge.   Neck: Normal range of motion. JVD (minimal jvd) present. No thyromegaly present.   Cardiovascular: Normal rate and regular rhythm.   No murmur heard.  Pulmonary/Chest: Effort normal. No respiratory distress. She has rales (few rales at bases).   Abdominal: Soft. Bowel sounds are normal. She exhibits no distension and no mass. There is no tenderness.   Musculoskeletal: She exhibits no edema or deformity.   Neurological: She is alert and oriented to person, place, and time.   Skin: Skin is warm and dry.   Psychiatric: She has a normal mood and affect. Her behavior is normal.   Nursing note and vitals reviewed.        CRANIAL NERVES     CN III, IV, VI   Extraocular motions are normal.        Significant Labs:   CBC:   Recent Labs   Lab 04/07/19  1729   WBC 5.12   HGB 11.6*   HCT 34.6*        CMP:   Recent Labs   Lab 04/07/19  1729      K 3.5      CO2 30*   *   BUN 14   CREATININE 0.9   CALCIUM 9.3   PROT 7.0   ALBUMIN 3.8   BILITOT 0.3   ALKPHOS 89   AST 14   ALT 15   ANIONGAP 6*   EGFRNONAA >60     Cardiac Markers: No results for input(s): CKMB, MYOGLOBIN, BNP, TROPISTAT in the last 48 hours.  Troponin:   Recent Labs   Lab 04/07/19  1729   TROPONINI 0.206*       Significant Imaging:   Imaging Results          CTA Chest Non-Coronary (PE Study) (Final result)  Result time 04/07/19 14:26:24    Final result by Dwayne Banks MD (04/07/19 14:26:24)                 Impression:      1. No pulmonary thromboembolism.  2. Interstitial findings suggest edema noting peribronchial thickening may reflect superimposed bronchial airway infection or inflammation.  Clinical correlation is advised.  3. There are small bilateral pleural effusions with associated compressive atelectasis of the lower lobes.  Correlation with volume status recommended.  Possible pulmonary nodule versus atelectasis (atelectasis favored) within the left lower lobe.  One year follow-up  as warranted.  4. Please see above for several additional findings.      Electronically signed by: Dwayne Banks MD  Date:    04/07/2019  Time:    14:26             Narrative:    EXAMINATION:  CTA CHEST NON CORONARY    CLINICAL HISTORY:  Chest pain, acute, PE suspected, intermed prob, positive D-dimer;    TECHNIQUE:  Low dose axial images, sagittal and coronal reformations were obtained from the thoracic inlet to the lung bases following the IV administration of 100 mL of Omnipaque 350.  Contrast timing was optimized to evaluate the pulmonary arteries.  MIP images were performed.    COMPARISON:  None    FINDINGS:  The structures at the base of the neck are grossly unremarkable.  Scattered nonenlarged mildly prominent mediastinal lymph nodes noted.  The heart is enlarged, no significant pericardial effusion.  The esophagus is unremarkable.  The thoracic aorta tapers normally.  The visualized portions of the kidneys, spleen, pancreas, adrenal glands and gallbladder are grossly unremarkable.  The liver is grossly unremarkable.    The airways are grossly patent.  There is bilateral peribronchial thickening primarily involving bronchial airways to the lower lobes.  There is scattered ground-glass attenuation and interlobular septal thickening suggesting edema versus chronic change.  There are scattered regions of air trapping.  There are small bilateral pleural effusions with associated compressive atelectasis of the lower lobes.  There is a possible pulmonary nodule versus atelectasis within the left lower lobe measuring 0.4 cm.  No large focal consolidation.  No pneumothorax.    Bolus timing is adequate for the evaluation of pulmonary thromboembolism.  No pulmonary arterial filling defect to the level of the segmental arteries and selected subsegmental arteries bilaterally to suggest pulmonary thromboembolism.    Degenerative changes are noted of the spine.  No significant axillary lymphadenopathy.                                X-Ray Chest 1 View (Final result)  Result time 04/07/19 13:03:04    Final result by Duarte Newberry MD (04/07/19 13:03:04)                 Impression:      1. Cardiomegaly with abnormal pulmonary parenchymal attenuation favoring edema.  A superimposed infectious or inflammatory process is possible.      Electronically signed by: Duarte Newberry MD  Date:    04/07/2019  Time:    13:03             Narrative:    EXAMINATION:  XR CHEST 1 VIEW    CLINICAL HISTORY:  Shortness of breath    TECHNIQUE:  Single frontal view of the chest was performed.    COMPARISON:  None.    FINDINGS:  Mediastinal structures are midline.  There is haziness of the hilar contours.  Cardiac silhouette is magnified by AP technique but appears enlarged.  Lung volumes are symmetric.  There is abnormal ground-glass attenuation throughout both lungs with scattered linear/septal opacities, findings that favor pulmonary edema.  No consolidation.  No pneumothorax or pleural effusions.  No free air beneath the diaphragm.  No acute osseous abnormalities.                                  Assessment/Plan:     * Acute on chronic systolic (congestive) heart failure  Patient with lung exam with minimal rales at bases, ,  chest x-ray with signs of edema and suggestive of edema on CTA.  Troponin 0.18.  Echo pending Previous Echo with EF of 45%  Cardiology consulted  Continue home lisinopril, holding coreg until UDS results  Continue IV lasix  Daily weights  Strict I&O's   Low salt cardiac diet  Cardiac monitoring    Elevated troponin  Likely due to strain from CHF and cocaine use. Patient denies any chest pain, and reports SOB improved with IV lasix. Have remained flat at 0.18. Continue aspirin, statin, and monitor on telemetry  Echo pending  Cardiology consulted    Cocaine abuse  Patient reports last use 2 days. She is motivated to quit. She wants to talk to a  about resources.   Greater than 3 minutes spent on counseling  patient on dangers of continued use.    Tobacco abuse  Greater than 3 minutes spent on counseling patient on dangers of continued tobacco abuse. Will provide tobacco cessation education.     Essential hypertension  Will continue home lisinopril seen in last cardiology note from care everywhere. Holding home coreg until drug screen results. PRN clonidine.     Hyperlipidemia  Continue home atorvastatin seen in care everywhere last note. Lipid panel pending.     Diabetes mellitus, type 2  BG of 165 on admit. Will hold home metformin. Sliding scale insulin, diabetic diet, hypoglycemic protocol, and POCT glucose checks.       VTE Risk Mitigation (From admission, onward)        Ordered     enoxaparin injection 40 mg  Daily      04/07/19 1714     IP VTE HIGH RISK PATIENT  Once      04/07/19 1652        VTE: lovenox  Code: full  Diet: diabetic cardiac  Dispo: pending cardiology eval and further diuresis     Sergio Bray PA-C  Department of Hospital Medicine   [facility]

## 2019-04-07 NOTE — SUBJECTIVE & OBJECTIVE
Past Medical History:   Diagnosis Date    Arthritis     CHF (congestive heart failure)     Diabetes mellitus     boderline    High cholesterol     MI (myocardial infarction)        Past Surgical History:   Procedure Laterality Date     SECTION, LOW TRANSVERSE         Review of patient's allergies indicates:  No Known Allergies    No current facility-administered medications on file prior to encounter.      Current Outpatient Medications on File Prior to Encounter   Medication Sig    metformin (GLUCOPHAGE-XR) 500 MG 24 hr tablet Take 500 mg by mouth daily with breakfast.    pravastatin (PRAVACHOL) 20 MG tablet Take 20 mg by mouth once daily.    gabapentin (NEURONTIN) 100 MG capsule Take 100 mg by mouth 3 (three) times daily.    ibuprofen (ADVIL,MOTRIN) 600 MG tablet Take 1 tablet (600 mg total) by mouth every 6 (six) hours as needed for Pain. Take with food to prevent stomach ulcer/bleed    lovastatin (MEVACOR) 20 MG tablet Take 20 mg by mouth every evening.    meloxicam (MOBIC) 15 MG tablet Take 1 tablet (15 mg total) by mouth once daily.    methocarbamol (ROBAXIN) 750 MG Tab Take 2 tablets (1,500 mg total) by mouth every 8 (eight) hours as needed (spasms).    oxycodone-acetaminophen (PERCOCET) 5-325 mg per tablet Take 1 tablet by mouth every 4 (four) hours as needed for Pain.    promethazine-phenylephrine (PROMETHAZINE VC) 6.25-5 mg/5 mL Syrp Take by mouth.     Family History     Problem Relation (Age of Onset)    Cancer Mother    Diabetes Mother        Tobacco Use    Smoking status: Current Every Day Smoker     Packs/day: 0.25    Smokeless tobacco: Never Used   Substance and Sexual Activity    Alcohol use: No     Comment: social    Drug use: No    Sexual activity: Yes     Partners: Male     Review of Systems   Constitutional: Negative for chills and fever.   HENT: Negative for nosebleeds and tinnitus.    Eyes: Negative for photophobia and visual disturbance.   Respiratory: Positive for  shortness of breath. Negative for wheezing.         Orthopnea   Cardiovascular: Negative for chest pain, palpitations and leg swelling.   Gastrointestinal: Negative for abdominal distention, abdominal pain, diarrhea, nausea and vomiting.   Genitourinary: Negative for dysuria, flank pain and hematuria.   Musculoskeletal: Negative for gait problem and joint swelling.   Skin: Negative for rash and wound.   Neurological: Negative for seizures and syncope.     Objective:     Vital Signs (Most Recent):  Temp: 98.5 °F (36.9 °C) (04/07/19 1659)  Pulse: 78 (04/07/19 1659)  Resp: 20 (04/07/19 1218)  BP: (!) 157/93 (04/07/19 1659)  SpO2: 99 % (04/07/19 1602) Vital Signs (24h Range):  Temp:  [98 °F (36.7 °C)-98.5 °F (36.9 °C)] 98.5 °F (36.9 °C)  Pulse:  [64-95] 78  Resp:  [18-20] 20  SpO2:  [92 %-100 %] 99 %  BP: (117-204)/() 157/93     Weight: 128.4 kg (283 lb)  Body mass index is 37.34 kg/m².    Physical Exam   Constitutional: She is oriented to person, place, and time. She appears well-developed and well-nourished. No distress.   HENT:   Head: Normocephalic and atraumatic.   Right Ear: External ear normal.   Left Ear: External ear normal.   Eyes: Conjunctivae and EOM are normal. Right eye exhibits no discharge. Left eye exhibits no discharge.   Neck: Normal range of motion. JVD (minimal jvd) present. No thyromegaly present.   Cardiovascular: Normal rate and regular rhythm.   No murmur heard.  Pulmonary/Chest: Effort normal. No respiratory distress. She has rales (few rales at bases).   Abdominal: Soft. Bowel sounds are normal. She exhibits no distension and no mass. There is no tenderness.   Musculoskeletal: She exhibits no edema or deformity.   Neurological: She is alert and oriented to person, place, and time.   Skin: Skin is warm and dry.   Psychiatric: She has a normal mood and affect. Her behavior is normal.   Nursing note and vitals reviewed.        CRANIAL NERVES     CN III, IV, VI   Extraocular motions are  normal.        Significant Labs:   CBC:   Recent Labs   Lab 04/07/19  1729   WBC 5.12   HGB 11.6*   HCT 34.6*        CMP:   Recent Labs   Lab 04/07/19  1729      K 3.5      CO2 30*   *   BUN 14   CREATININE 0.9   CALCIUM 9.3   PROT 7.0   ALBUMIN 3.8   BILITOT 0.3   ALKPHOS 89   AST 14   ALT 15   ANIONGAP 6*   EGFRNONAA >60     Cardiac Markers: No results for input(s): CKMB, MYOGLOBIN, BNP, TROPISTAT in the last 48 hours.  Troponin:   Recent Labs   Lab 04/07/19  1729   TROPONINI 0.206*       Significant Imaging:   Imaging Results          CTA Chest Non-Coronary (PE Study) (Final result)  Result time 04/07/19 14:26:24    Final result by Dwayne Banks MD (04/07/19 14:26:24)                 Impression:      1. No pulmonary thromboembolism.  2. Interstitial findings suggest edema noting peribronchial thickening may reflect superimposed bronchial airway infection or inflammation.  Clinical correlation is advised.  3. There are small bilateral pleural effusions with associated compressive atelectasis of the lower lobes.  Correlation with volume status recommended.  Possible pulmonary nodule versus atelectasis (atelectasis favored) within the left lower lobe.  One year follow-up as warranted.  4. Please see above for several additional findings.      Electronically signed by: Dwayne Banks MD  Date:    04/07/2019  Time:    14:26             Narrative:    EXAMINATION:  CTA CHEST NON CORONARY    CLINICAL HISTORY:  Chest pain, acute, PE suspected, intermed prob, positive D-dimer;    TECHNIQUE:  Low dose axial images, sagittal and coronal reformations were obtained from the thoracic inlet to the lung bases following the IV administration of 100 mL of Omnipaque 350.  Contrast timing was optimized to evaluate the pulmonary arteries.  MIP images were performed.    COMPARISON:  None    FINDINGS:  The structures at the base of the neck are grossly unremarkable.  Scattered nonenlarged mildly prominent  mediastinal lymph nodes noted.  The heart is enlarged, no significant pericardial effusion.  The esophagus is unremarkable.  The thoracic aorta tapers normally.  The visualized portions of the kidneys, spleen, pancreas, adrenal glands and gallbladder are grossly unremarkable.  The liver is grossly unremarkable.    The airways are grossly patent.  There is bilateral peribronchial thickening primarily involving bronchial airways to the lower lobes.  There is scattered ground-glass attenuation and interlobular septal thickening suggesting edema versus chronic change.  There are scattered regions of air trapping.  There are small bilateral pleural effusions with associated compressive atelectasis of the lower lobes.  There is a possible pulmonary nodule versus atelectasis within the left lower lobe measuring 0.4 cm.  No large focal consolidation.  No pneumothorax.    Bolus timing is adequate for the evaluation of pulmonary thromboembolism.  No pulmonary arterial filling defect to the level of the segmental arteries and selected subsegmental arteries bilaterally to suggest pulmonary thromboembolism.    Degenerative changes are noted of the spine.  No significant axillary lymphadenopathy.                               X-Ray Chest 1 View (Final result)  Result time 04/07/19 13:03:04    Final result by Duarte Newberry MD (04/07/19 13:03:04)                 Impression:      1. Cardiomegaly with abnormal pulmonary parenchymal attenuation favoring edema.  A superimposed infectious or inflammatory process is possible.      Electronically signed by: Duarte Newberry MD  Date:    04/07/2019  Time:    13:03             Narrative:    EXAMINATION:  XR CHEST 1 VIEW    CLINICAL HISTORY:  Shortness of breath    TECHNIQUE:  Single frontal view of the chest was performed.    COMPARISON:  None.    FINDINGS:  Mediastinal structures are midline.  There is haziness of the hilar contours.  Cardiac silhouette is magnified by AP technique but  appears enlarged.  Lung volumes are symmetric.  There is abnormal ground-glass attenuation throughout both lungs with scattered linear/septal opacities, findings that favor pulmonary edema.  No consolidation.  No pneumothorax or pleural effusions.  No free air beneath the diaphragm.  No acute osseous abnormalities.

## 2019-04-07 NOTE — ASSESSMENT & PLAN NOTE
Patient reports last use 2 days. She is motivated to quit. She wants to talk to a  about resources.   Greater than 3 minutes spent on counseling patient on dangers of continued use.

## 2019-04-07 NOTE — ED PROVIDER NOTES
"Encounter Date: 2019    SCRIBE #1 NOTE: I, Mellissa Wilkins, am scribing for, and in the presence of,  Dr. Dash . I have scribed the following portions of the note - Other sections scribed: HPI,ROS,PE .       History     Chief Complaint   Patient presents with    Shortness of Breath     Pt states," Shortness of breath since last night."     54 y/o/f with a hx of smoking and MI presents with SOB that started today around 5 am. She states she has had similar symptoms prior but today was the worse that she has experienced . SOB worse with walking or laying down. Also experiencing a dry cough.  Denies fever, sore throat, nasal congestion, recent travel, recent surgery, swelling to legs, N/V/D, dizziness, abdominal pain or CP.  No pain currently    The history is provided by the patient. No  was used.     Review of patient's allergies indicates:  No Known Allergies  Past Medical History:   Diagnosis Date    Arthritis     Diabetes mellitus     boderline    High cholesterol      Past Surgical History:   Procedure Laterality Date     SECTION, LOW TRANSVERSE       Family History   Problem Relation Age of Onset    Cancer Mother     Diabetes Mother      Social History     Tobacco Use    Smoking status: Current Every Day Smoker     Packs/day: 0.25    Smokeless tobacco: Never Used   Substance Use Topics    Alcohol use: No     Comment: social    Drug use: No     Review of Systems   Constitutional: Negative for fever.   HENT: Negative for congestion and sore throat.    Respiratory: Positive for cough and shortness of breath.    Cardiovascular: Negative for chest pain and leg swelling.   Gastrointestinal: Negative for abdominal pain, diarrhea, nausea and vomiting.   Neurological: Negative for dizziness and headaches.   All other systems reviewed and are negative.      Physical Exam     Initial Vitals [19 1201]   BP Pulse Resp Temp SpO2   (!) 204/102 95 18 98 °F (36.7 °C) (!) 92 " %      MAP       --         Physical Exam    Nursing note and vitals reviewed.  Constitutional: She appears well-developed and well-nourished.   HENT:   Head: Normocephalic and atraumatic.   Eyes: Conjunctivae are normal.   Neck: Normal range of motion. Neck supple.   Cardiovascular: Normal rate, regular rhythm, normal heart sounds and intact distal pulses. Exam reveals no gallop and no friction rub.    No murmur heard.  Pulmonary/Chest: Effort normal. No respiratory distress. She has no wheezes. She has rhonchi (bibasilar ). She has no rales.   Musculoskeletal: Normal range of motion.   Neurological: She is alert and oriented to person, place, and time.   Skin: Skin is warm and dry.   Psychiatric: She has a normal mood and affect.         ED Course   Critical Care  Date/Time: 4/7/2019 3:33 PM  Performed by: Jumana Dash MD  Authorized by: Jumana Dash MD   Direct patient critical care time: 30 minutes  Additional history critical care time: 5 minutes  Ordering / reviewing critical care time: 5 minutes  Documentation critical care time: 5 minutes  Consulting other physicians critical care time: 5 minutes  Total critical care time (exclusive of procedural time) : 50 minutes  Critical care was time spent personally by me on the following activities: evaluation of patient's response to treatment, ordering and performing treatments and interventions, pulse oximetry, examination of patient, ordering and review of laboratory studies, re-evaluation of patient's condition, review of old charts and ordering and review of radiographic studies.        Labs Reviewed   TROPONIN ISTAT - Abnormal; Notable for the following components:       Result Value    POC Cardiac Troponin I 0.18 (*)     All other components within normal limits   TROPONIN ISTAT - Abnormal; Notable for the following components:    POC Cardiac Troponin I 0.18 (*)     All other components within normal limits   POCT GLUCOSE - Abnormal; Notable for  the following components:    POCT Glucose 165 (*)     All other components within normal limits   POCT B-TYPE NATRIURETIC PEPTIDE (BNP) - Abnormal; Notable for the following components:    POC B-Type Natriuretic Peptide 434 (*)     All other components within normal limits   POCT D DIMER - Abnormal; Notable for the following components:    POC D-DI 1090 (*)     All other components within normal limits   POCT CBC   POCT GLUCOSE MONITORING CONTINUOUS   POCT TROPONIN   POCT B-TYPE NATRIURETIC PEPTIDE (BNP)   POCT CMP   POCT CMP   POCT D DIMER   POCT TROPONIN     EKG Readings: (Independently Interpreted)   Initial Reading: No STEMI.   Normal sinus rhythm, nonspecific ST changes, Q-waves in the inferior laterally       Imaging Results          CTA Chest Non-Coronary (PE Study) (Final result)  Result time 04/07/19 14:26:24    Final result by Dwayne Banks MD (04/07/19 14:26:24)                 Impression:      1. No pulmonary thromboembolism.  2. Interstitial findings suggest edema noting peribronchial thickening may reflect superimposed bronchial airway infection or inflammation.  Clinical correlation is advised.  3. There are small bilateral pleural effusions with associated compressive atelectasis of the lower lobes.  Correlation with volume status recommended.  Possible pulmonary nodule versus atelectasis (atelectasis favored) within the left lower lobe.  One year follow-up as warranted.  4. Please see above for several additional findings.      Electronically signed by: Dwayne Banks MD  Date:    04/07/2019  Time:    14:26             Narrative:    EXAMINATION:  CTA CHEST NON CORONARY    CLINICAL HISTORY:  Chest pain, acute, PE suspected, intermed prob, positive D-dimer;    TECHNIQUE:  Low dose axial images, sagittal and coronal reformations were obtained from the thoracic inlet to the lung bases following the IV administration of 100 mL of Omnipaque 350.  Contrast timing was optimized to evaluate the  pulmonary arteries.  MIP images were performed.    COMPARISON:  None    FINDINGS:  The structures at the base of the neck are grossly unremarkable.  Scattered nonenlarged mildly prominent mediastinal lymph nodes noted.  The heart is enlarged, no significant pericardial effusion.  The esophagus is unremarkable.  The thoracic aorta tapers normally.  The visualized portions of the kidneys, spleen, pancreas, adrenal glands and gallbladder are grossly unremarkable.  The liver is grossly unremarkable.    The airways are grossly patent.  There is bilateral peribronchial thickening primarily involving bronchial airways to the lower lobes.  There is scattered ground-glass attenuation and interlobular septal thickening suggesting edema versus chronic change.  There are scattered regions of air trapping.  There are small bilateral pleural effusions with associated compressive atelectasis of the lower lobes.  There is a possible pulmonary nodule versus atelectasis within the left lower lobe measuring 0.4 cm.  No large focal consolidation.  No pneumothorax.    Bolus timing is adequate for the evaluation of pulmonary thromboembolism.  No pulmonary arterial filling defect to the level of the segmental arteries and selected subsegmental arteries bilaterally to suggest pulmonary thromboembolism.    Degenerative changes are noted of the spine.  No significant axillary lymphadenopathy.                               X-Ray Chest 1 View (Final result)  Result time 04/07/19 13:03:04    Final result by Duarte Newberry MD (04/07/19 13:03:04)                 Impression:      1. Cardiomegaly with abnormal pulmonary parenchymal attenuation favoring edema.  A superimposed infectious or inflammatory process is possible.      Electronically signed by: Duarte Newberry MD  Date:    04/07/2019  Time:    13:03             Narrative:    EXAMINATION:  XR CHEST 1 VIEW    CLINICAL HISTORY:  Shortness of breath    TECHNIQUE:  Single frontal view of the  chest was performed.    COMPARISON:  None.    FINDINGS:  Mediastinal structures are midline.  There is haziness of the hilar contours.  Cardiac silhouette is magnified by AP technique but appears enlarged.  Lung volumes are symmetric.  There is abnormal ground-glass attenuation throughout both lungs with scattered linear/septal opacities, findings that favor pulmonary edema.  No consolidation.  No pneumothorax or pleural effusions.  No free air beneath the diaphragm.  No acute osseous abnormalities.                                 Medical Decision Making:   Initial Assessment:   54 y/o/f presents with SOB that started this morning around 5 am. Exam significant for bibasilar rhonchi. No wheezing or respiratory distress.     Independently Interpreted Test(s):   I have ordered and independently interpreted EKG Reading(s) - see prior notes  Clinical Tests:   Lab Tests: Ordered and Reviewed  Radiological Study: Ordered and Reviewed  Medical Tests: Ordered and Reviewed  ED Management:  Breathing treatment, CXR, and EKG ordered.  Patient's cardiac workup was significant for an elevated troponin of 0.18 which is high for this facility.  She also had an elevated BNP.  Patient was given an aspirin, nebulizer treatment and Lasix.  Her heart score is 6.  With the patient's personal history of MI and a stent patient should be admitted to the hospital.  Repeat troponin will be done.  Patient had an elevated D-dimer.  CT showed no evidence of pulmonary embolism.  Will be admitted to Dr. Baeza    Additional MDM:   Heart Score:    History:          Moderately suspicious.  ECG:             Nonspecific repolarisation disturbance  Age:               45-65 years  Risk factors: >= 3 risk factors or history of atherosclerotic disease  Troponin:       1-2x normal limit  Final Score: 6             Scribe Attestation:   Scribe #1: I performed the above scribed service and the documentation accurately describes the services I performed. I  attest to the accuracy of the note.       I, Dr. Jumana Dash, personally performed the services described in this documentation. All medical record entries made by the scribe were at my direction and in my presence.  I have reviewed the chart and agree that the record reflects my personal performance and is accurate and complete. Jumana Dash MD.  3:33 PM 04/07/2019             Clinical Impression:     1. Elevated troponin    2. Shortness of breath    3. SOB (shortness of breath)    4. Acute congestive heart failure, unspecified heart failure type    5. NSTEMI (non-ST elevated myocardial infarction)                                   Jumana Dash MD  04/07/19 1538

## 2019-04-07 NOTE — ASSESSMENT & PLAN NOTE
Patient with lung exam with minimal rales at bases, ,  chest x-ray with signs of edema and suggestive of edema on CTA.  Troponin 0.18.  Echo pending Previous Echo with EF of 45%  Cardiology consulted  Continue home lisinopril, holding coreg until UDS results  Continue IV lasix  Daily weights  Strict I&O's   Low salt cardiac diet  Cardiac monitoring

## 2019-04-07 NOTE — ASSESSMENT & PLAN NOTE
BG of 165 on admit. Will hold home metformin. Sliding scale insulin, diabetic diet, hypoglycemic protocol, and POCT glucose checks.

## 2019-04-07 NOTE — NURSING
Upon arrival to floor from [facility]: cardiac monitoring in progress, patient oriented to room, call bell in reach and bed in lowest position. Denies pain or discomfort at this time. No apparent distress noted.

## 2019-04-08 VITALS
SYSTOLIC BLOOD PRESSURE: 144 MMHG | WEIGHT: 279 LBS | RESPIRATION RATE: 18 BRPM | OXYGEN SATURATION: 95 % | DIASTOLIC BLOOD PRESSURE: 83 MMHG | HEART RATE: 66 BPM | HEIGHT: 69 IN | BODY MASS INDEX: 41.32 KG/M2 | TEMPERATURE: 98 F

## 2019-04-08 PROBLEM — I25.10 CORONARY ARTERY DISEASE INVOLVING NATIVE CORONARY ARTERY WITHOUT ANGINA PECTORIS: Status: ACTIVE | Noted: 2019-04-08

## 2019-04-08 LAB
ANION GAP SERPL CALC-SCNC: 8 MMOL/L (ref 8–16)
AORTIC ROOT ANNULUS: 3.26 CM
AORTIC VALVE CUSP SEPERATION: 2.56 CM
ASCENDING AORTA: 2.47 CM
AV INDEX (PROSTH): 0.84
AV MEAN GRADIENT: 2.29 MMHG
AV PEAK GRADIENT: 3.31 MMHG
AV VALVE AREA: 3.19 CM2
AV VELOCITY RATIO: 0.81
BASOPHILS # BLD AUTO: 0.01 K/UL (ref 0–0.2)
BASOPHILS NFR BLD: 0.2 % (ref 0–1.9)
BNP SERPL-MCNC: 353 PG/ML (ref 0–99)
BSA FOR ECHO PROCEDURE: 2.48 M2
BUN SERPL-MCNC: 15 MG/DL (ref 6–20)
CALCIUM SERPL-MCNC: 9 MG/DL (ref 8.7–10.5)
CHLORIDE SERPL-SCNC: 109 MMOL/L (ref 95–110)
CO2 SERPL-SCNC: 28 MMOL/L (ref 23–29)
CREAT SERPL-MCNC: 0.8 MG/DL (ref 0.5–1.4)
CV ECHO LV RWT: 0.54 CM
CV STRESS BASE HR: 53 BPM
DIASTOLIC BLOOD PRESSURE: 81 MMHG
DIFFERENTIAL METHOD: ABNORMAL
DOP CALC AO PEAK VEL: 0.91 M/S
DOP CALC AO VTI: 19.11 CM
DOP CALC LVOT AREA: 3.8 CM2
DOP CALC LVOT DIAMETER: 2.2 CM
DOP CALC LVOT PEAK VEL: 0.74 M/S
DOP CALC LVOT STROKE VOLUME: 60.87 CM3
DOP CALCLVOT PEAK VEL VTI: 16.02 CM
E WAVE DECELERATION TIME: 207.25 MSEC
E/A RATIO: 2
E/E' RATIO: 10.13
ECHO LV POSTERIOR WALL: 1.61 CM (ref 0.6–1.1)
EOSINOPHIL # BLD AUTO: 0.1 K/UL (ref 0–0.5)
EOSINOPHIL NFR BLD: 1.1 % (ref 0–8)
ERYTHROCYTE [DISTWIDTH] IN BLOOD BY AUTOMATED COUNT: 13.5 % (ref 11.5–14.5)
EST. GFR  (AFRICAN AMERICAN): >60 ML/MIN/1.73 M^2
EST. GFR  (NON AFRICAN AMERICAN): >60 ML/MIN/1.73 M^2
ESTIMATED AVG GLUCOSE: 148 MG/DL (ref 68–131)
FRACTIONAL SHORTENING: 32 % (ref 28–44)
GLUCOSE SERPL-MCNC: 125 MG/DL (ref 70–110)
HBA1C MFR BLD HPLC: 6.8 % (ref 4–5.6)
HCT VFR BLD AUTO: 33.3 % (ref 37–48.5)
HGB BLD-MCNC: 11.3 G/DL (ref 12–16)
INTERVENTRICULAR SEPTUM: 1.53 CM (ref 0.6–1.1)
IVRT: 0.12 MSEC
LA MAJOR: 5.92 CM
LA MINOR: 6.65 CM
LA WIDTH: 5.91 CM
LEFT ATRIUM SIZE: 5.61 CM
LEFT ATRIUM VOLUME INDEX: 74.2 ML/M2
LEFT ATRIUM VOLUME: 176.53 CM3
LEFT INTERNAL DIMENSION IN SYSTOLE: 4.03 CM (ref 2.1–4)
LEFT VENTRICLE DIASTOLIC VOLUME INDEX: 73.74 ML/M2
LEFT VENTRICLE DIASTOLIC VOLUME: 175.48 ML
LEFT VENTRICLE MASS INDEX: 188.2 G/M2
LEFT VENTRICLE SYSTOLIC VOLUME INDEX: 30 ML/M2
LEFT VENTRICLE SYSTOLIC VOLUME: 71.28 ML
LEFT VENTRICULAR INTERNAL DIMENSION IN DIASTOLE: 5.93 CM (ref 3.5–6)
LEFT VENTRICULAR MASS: 447.9 G
LV LATERAL E/E' RATIO: 8.44
LV SEPTAL E/E' RATIO: 12.67
LYMPHOCYTES # BLD AUTO: 2.7 K/UL (ref 1–4.8)
LYMPHOCYTES NFR BLD: 49.4 % (ref 18–48)
MCH RBC QN AUTO: 29.7 PG (ref 27–31)
MCHC RBC AUTO-ENTMCNC: 33.9 G/DL (ref 32–36)
MCV RBC AUTO: 88 FL (ref 82–98)
MONOCYTES # BLD AUTO: 0.4 K/UL (ref 0.3–1)
MONOCYTES NFR BLD: 6.5 % (ref 4–15)
MV PEAK A VEL: 0.38 M/S
MV PEAK E VEL: 0.76 M/S
NEUTROPHILS # BLD AUTO: 2.3 K/UL (ref 1.8–7.7)
NEUTROPHILS NFR BLD: 42.8 % (ref 38–73)
NUC STRESS EJECTION FRACTION: 33 %
OHS CV CPX 85 PERCENT MAX PREDICTED HEART RATE MALE: 134
OHS CV CPX MAX PREDICTED HEART RATE: 158
OHS CV CPX PATIENT IS FEMALE: 1
OHS CV CPX PATIENT IS MALE: 0
OHS CV CPX PEAK DIASTOLIC BLOOD PRESSURE: 80 MMHG
OHS CV CPX PEAK HEAR RATE: 93 BPM
OHS CV CPX PEAK RATE PRESSURE PRODUCT: NORMAL
OHS CV CPX PEAK SYSTOLIC BLOOD PRESSURE: 149 MMHG
OHS CV CPX PERCENT MAX PREDICTED HEART RATE ACHIEVED: 59
OHS CV CPX RATE PRESSURE PRODUCT PRESENTING: 7208
PISA TR MAX VEL: 3.62 M/S
PLATELET # BLD AUTO: 247 K/UL (ref 150–350)
PMV BLD AUTO: 9.8 FL (ref 9.2–12.9)
POCT GLUCOSE: 146 MG/DL (ref 70–110)
POCT GLUCOSE: 187 MG/DL (ref 70–110)
POTASSIUM SERPL-SCNC: 3.5 MMOL/L (ref 3.5–5.1)
PULM VEIN S/D RATIO: 2.44
PV PEAK D VEL: 0.34 M/S
PV PEAK S VEL: 0.83 M/S
PV PEAK VELOCITY: 0.67 CM/S
RA MAJOR: 4.32 CM
RA PRESSURE: 3 MMHG
RA WIDTH: 3.69 CM
RBC # BLD AUTO: 3.8 M/UL (ref 4–5.4)
RIGHT VENTRICULAR END-DIASTOLIC DIMENSION: 4.42 CM
RV TISSUE DOPPLER FREE WALL SYSTOLIC VELOCITY 1 (APICAL 4 CHAMBER VIEW): 13.19 M/S
SINUS: 3.05 CM
SODIUM SERPL-SCNC: 145 MMOL/L (ref 136–145)
STJ: 2.42 CM
SYSTOLIC BLOOD PRESSURE: 136 MMHG
TDI LATERAL: 0.09
TDI SEPTAL: 0.06
TDI: 0.08
TR MAX PG: 52.42 MMHG
TRICUSPID ANNULAR PLANE SYSTOLIC EXCURSION: 2.69 CM
TROPONIN I SERPL DL<=0.01 NG/ML-MCNC: 0.21 NG/ML (ref 0–0.03)
TSH SERPL DL<=0.005 MIU/L-ACNC: 0.97 UIU/ML (ref 0.4–4)
TV REST PULMONARY ARTERY PRESSURE: 55 MMHG
WBC # BLD AUTO: 5.36 K/UL (ref 3.9–12.7)

## 2019-04-08 PROCEDURE — 94761 N-INVAS EAR/PLS OXIMETRY MLT: CPT

## 2019-04-08 PROCEDURE — 90670 PCV13 VACCINE IM: CPT | Performed by: HOSPITALIST

## 2019-04-08 PROCEDURE — 83880 ASSAY OF NATRIURETIC PEPTIDE: CPT

## 2019-04-08 PROCEDURE — 99204 OFFICE O/P NEW MOD 45 MIN: CPT | Mod: 25,,, | Performed by: INTERNAL MEDICINE

## 2019-04-08 PROCEDURE — 99204 PR OFFICE/OUTPT VISIT, NEW, LEVL IV, 45-59 MIN: ICD-10-PCS | Mod: 25,,, | Performed by: INTERNAL MEDICINE

## 2019-04-08 PROCEDURE — 90472 IMMUNIZATION ADMIN EACH ADD: CPT | Performed by: HOSPITALIST

## 2019-04-08 PROCEDURE — 90686 IIV4 VACC NO PRSV 0.5 ML IM: CPT | Performed by: HOSPITALIST

## 2019-04-08 PROCEDURE — 80048 BASIC METABOLIC PNL TOTAL CA: CPT

## 2019-04-08 PROCEDURE — 63600175 PHARM REV CODE 636 W HCPCS: Performed by: INTERNAL MEDICINE

## 2019-04-08 PROCEDURE — 84443 ASSAY THYROID STIM HORMONE: CPT

## 2019-04-08 PROCEDURE — 36415 COLL VENOUS BLD VENIPUNCTURE: CPT

## 2019-04-08 PROCEDURE — 85025 COMPLETE CBC W/AUTO DIFF WBC: CPT

## 2019-04-08 PROCEDURE — 27000221 HC OXYGEN, UP TO 24 HOURS

## 2019-04-08 PROCEDURE — 63600175 PHARM REV CODE 636 W HCPCS: Performed by: PHYSICIAN ASSISTANT

## 2019-04-08 PROCEDURE — 84484 ASSAY OF TROPONIN QUANT: CPT

## 2019-04-08 PROCEDURE — 90471 IMMUNIZATION ADMIN: CPT | Performed by: HOSPITALIST

## 2019-04-08 PROCEDURE — G0378 HOSPITAL OBSERVATION PER HR: HCPCS

## 2019-04-08 PROCEDURE — 63600175 PHARM REV CODE 636 W HCPCS: Performed by: HOSPITALIST

## 2019-04-08 RX ORDER — LISINOPRIL 5 MG/1
5 TABLET ORAL DAILY
Qty: 90 TABLET | Refills: 3 | Status: SHIPPED | OUTPATIENT
Start: 2019-04-09 | End: 2020-04-08

## 2019-04-08 RX ORDER — CLOPIDOGREL BISULFATE 75 MG/1
75 TABLET ORAL DAILY
Qty: 30 TABLET | Refills: 11 | Status: SHIPPED | OUTPATIENT
Start: 2019-04-09 | End: 2020-04-08

## 2019-04-08 RX ORDER — REGADENOSON 0.08 MG/ML
0.4 INJECTION, SOLUTION INTRAVENOUS ONCE
Status: COMPLETED | OUTPATIENT
Start: 2019-04-08 | End: 2019-04-08

## 2019-04-08 RX ORDER — NAPROXEN SODIUM 220 MG/1
81 TABLET, FILM COATED ORAL DAILY
Refills: 0 | COMMUNITY
Start: 2019-04-09 | End: 2020-04-08

## 2019-04-08 RX ADMIN — PNEUMOCOCCAL 13-VALENT CONJUGATE VACCINE 0.5 ML: 2.2; 2.2; 2.2; 2.2; 2.2; 4.4; 2.2; 2.2; 2.2; 2.2; 2.2; 2.2; 2.2 INJECTION, SUSPENSION INTRAMUSCULAR at 03:04

## 2019-04-08 RX ADMIN — INFLUENZA A VIRUS A/MICHIGAN/45/2015 X-275 (H1N1) ANTIGEN (FORMALDEHYDE INACTIVATED), INFLUENZA A VIRUS A/SINGAPORE/INFIMH-16-0019/2016 IVR-186 (H3N2) ANTIGEN (FORMALDEHYDE INACTIVATED), INFLUENZA B VIRUS B/PHUKET/3073/2013 ANTIGEN (FORMALDEHYDE INACTIVATED), AND INFLUENZA B VIRUS B/MARYLAND/15/2016 BX-69A ANTIGEN (FORMALDEHYDE INACTIVATED) 0.5 ML: 15; 15; 15; 15 INJECTION, SUSPENSION INTRAMUSCULAR at 03:04

## 2019-04-08 RX ADMIN — FUROSEMIDE 40 MG: 10 INJECTION, SOLUTION INTRAVENOUS at 09:04

## 2019-04-08 RX ADMIN — REGADENOSON 0.4 MG: 0.08 INJECTION, SOLUTION INTRAVENOUS at 12:04

## 2019-04-08 NOTE — PROGRESS NOTES
"[facility] Medicine  Progress Note    Patient Name: Jade Zuñiga  MRN: [id number]  Patient Class: OP- Observation   Admission Date: 4/7/2019  Length of Stay: 1 days  Attending Physician: Adelina Baeza MD  Primary Care Provider: Santiago Knox MD        Subjective:     Principal Problem:Acute on chronic systolic (congestive) heart failure    HPI:  Jade Zuñiga 53 y.o. female with CAD, HTN, HLD, DM2, and cocaine abuse presents to the hospital with a chief complaint of SOB. She reports she awoke this morning with severe SOB. She had been noting over the last week increasing SOB, but today became unbearable causing presentation to the hospital. The SOB is worsened with walking and laying flat. She reports laying flat she felt as though she was smothering. Her SOB is improved now after lasix provided by the ED. She noticed she has been gaining weight and contributed that to the SOB. She also has been eating fried chicken and feels that played a role. She reports she stopped cocaine use after previous MI, however, used cocaine 2 days ago. These symptoms do not feel similar to previous MI. She is compliant with aspirin, plavix, statin, and coreg. She is unsure of if she takes other BP medications. She endorses SOB and orthopnea. She denies fever, chest pain, leg swelling, N/V/D, abdominal pain, dysuria, and syncope. She also reports she smokes 3 cigarettes a day.     [facility] chart review  8/2018 "08/2018 PATIENT ADMITTED, DIAGNOSED WITH ACUTE ANTERIOR STEMI WITH CARDIOGENIC SHOCK. URINE FOUND POSITIVE FOR COCAINE. PATIENT WITH VENTRICULAR ECTOPY, HYPOKALEMIA. SHE WAS FEBRILE. GPC BETA HEMOLYTIC STREP AND STAPH AUREUS BACTEREMIA. DORYS NEGATIVE FOR ENDOCARDITIS. SHE UNDERWENT LEFT HEART CATH WITH PTCA AND STENTING TO LAD AND RCA. BARE METAL WAS PLACED TO THE LAD AND DRUG ELUTING STENT TO THE RCA. SHE HAD RIGHT LEG PAIN. SHE DID HAVE A BALLOON PUMP IN PLACE. THEY DID DO A CT SCAN TO EVALUATE " FOR INFECTION WHICH WAS NEGATIVE AND A VENOUS ULTRASOUND WHICH WAS NEGATIVE FOR DVT. ECHO REVEALED NO EVIDENCE OF VEGETATION AND EF WAS 45-50%. HEART CATH REVEALED NO SIGNIFICANT DISEASE IN THE LEFT MAIN, TOTALLY OCCLUDED LAD IN THE PROXIMAL PORTION. CIRCUMFLEX NO SIGNIFICANT DISEASE, RCA DOMINANT, TOTAL OCCLUSION IN THE PROXIMAL PORTION.    In the ED, troponin 0.18, EKG of NSR without ST segment elevation, chest x-ray with signs of edema, D-dimer of 1000, CTA without PE and findings suggesting edema, and BNP of 434.     Hospital Course:  Jade Zuñiga 53 y.o. female admitted to observation for shortness of breath felt due to acute on chronic diastolic heart failure vs NSTEMI. In the ED, troponin 0.18, EKG of NSR without ST segment elevation, chest x-ray with signs of edema, D-dimer of 1000, CTA without PE and findings suggesting edema, and BNP of 434. CT also showed possible nodule versus atelectasis within the left lower lobe and Radiology recommended 1 year follow-up. Urine tox positive for cocaine (last use 2 days ago).  Patient with history of STEMI August 2018 hospitalized at [facility] when patient had stent to RCA and LAD.  Symptoms improved with IV Lasix.  Urine output not all charted in epic.  Cardiology consult pending.  2D echo pending.    Interval History:  Reports breathing better this a.m. after IV diuretic.  Denies chest pain.    Review of Systems   Constitutional: Negative for chills and fever.   HENT: Negative for nosebleeds and tinnitus.    Eyes: Negative for photophobia and visual disturbance.   Respiratory: Positive for shortness of breath. Negative for wheezing.         Orthopnea   Cardiovascular: Negative for chest pain, palpitations and leg swelling.   Gastrointestinal: Negative for abdominal distention, abdominal pain, diarrhea, nausea and vomiting.   Genitourinary: Negative for dysuria, flank pain and hematuria.   Musculoskeletal: Negative for gait problem and joint  swelling.   Skin: Negative for rash and wound.   Neurological: Negative for seizures and syncope.     Objective:     Vital Signs (Most Recent):  Temp: 98.1 °F (36.7 °C) (04/08/19 0742)  Pulse: 70 (04/08/19 0742)  Resp: 17 (04/08/19 0742)  BP: 136/79 (04/08/19 0742)  SpO2: 95 % (04/08/19 0742) Vital Signs (24h Range):  Temp:  [97.7 °F (36.5 °C)-98.5 °F (36.9 °C)] 98.1 °F (36.7 °C)  Pulse:  [64-95] 70  Resp:  [17-20] 17  SpO2:  [92 %-100 %] 95 %  BP: (117-204)/() 136/79     Weight: 127 kg (279 lb 15.8 oz)  Body mass index is 41.35 kg/m².    Intake/Output Summary (Last 24 hours) at 4/8/2019 0836  Last data filed at 4/7/2019 2100  Gross per 24 hour   Intake --   Output 1600 ml   Net -1600 ml      Physical Exam   Constitutional: She is oriented to person, place, and time. She appears well-developed and well-nourished. No distress.   HENT:   Head: Normocephalic and atraumatic.   Right Ear: External ear normal.   Left Ear: External ear normal.   Eyes: Conjunctivae and EOM are normal. Right eye exhibits no discharge. Left eye exhibits no discharge.   Neck: Normal range of motion. JVD (minimal jvd) present. No thyromegaly present.   Cardiovascular: Normal rate and regular rhythm.   No murmur heard.  Pulmonary/Chest: Effort normal. No respiratory distress. She has rales (few rales at bases).   Abdominal: Soft. Bowel sounds are normal. She exhibits no distension and no mass. There is no tenderness.   Musculoskeletal: She exhibits no edema or deformity.   Neurological: She is alert and oriented to person, place, and time.   Skin: Skin is warm and dry.   Psychiatric: She has a normal mood and affect. Her behavior is normal.   Nursing note and vitals reviewed.      Significant Labs: All pertinent labs within the past 24 hours have been reviewed.    Significant Imaging: I have reviewed and interpreted all pertinent imaging results/findings within the past 24 hours.    Assessment/Plan:      * Acute on chronic systolic  (congestive) heart failure  No 2D echo on file. Previous Echo with EF of 45% per documentation at [facility].  Elevated troponin turn flat pattern.  Unclear if from cocaine use versus CAD S patient with recent stent 2 L 80 and RCA in August 2018.  Daily weights  Strict I&O's   Low salt cardiac diet  Guideline directed medical therapy accordingly.Continue home lisinopril  Continue IV lasix. Hold Coreg pending 2D echo and positive cocaine.  Follow up Cardiology further recommendation.    Coronary artery disease involving native coronary artery without angina pectoris  Patient history with recent STEMI August 2018 with FREDDY to LAD and RCA.  Patient reports compliance with aspirin,Plavix, statin.  Currently denies chest pain. Troponin elevated possible to cocaine use versus NSTEMI?  Continue aspirin Plavix lisinopril.  Hold Coreg.    2D echo ordered. Follow-up with Cardiology further recommendation.  Keep NPO.      Elevated troponin  See above 1.    Cocaine abuse  Patient reports last use 2 days. She is motivated to quit. She wants to talk to a  about resources.   Greater than 3 minutes spent on counseling patient on dangers of continued use.    Tobacco abuse  Greater than 3 minutes spent on counseling patient on dangers of continued tobacco abuse. Will provide tobacco cessation education.     Essential hypertension  Will continue home lisinopril seen in last cardiology note from care everywhere. Holding home coreg as above.    Hyperlipidemia  Continue home atorvastatin seen in care everywhere last note. Lipid panel pending.     Diabetes mellitus, type 2  Lab Results   Component Value Date    HGBA1C 6.8 (H) 04/07/2019   Hold home metformin. Sliding scale insulin.    DAPT  AMITA/SCD     Denise Cha NP  Department of [facility] Medicine   [facility]

## 2019-04-08 NOTE — PLAN OF CARE
04/08/19 1521   Final Note   Assessment Type Final Discharge Note   Anticipated Discharge Disposition Home   Hospital Follow Up  Appt(s) scheduled? Yes   Discharge plans and expectations educations in teach back method with documentation complete? Yes   Right Care Referral Info   Post Acute Recommendation No Care   pts nurse Tea notified that all CM needs have been addressed and that she can d/c from CM standpoint.

## 2019-04-08 NOTE — ASSESSMENT & PLAN NOTE
No 2D echo on file. Previous Echo with EF of 45% per documentation at [facility].  Elevated troponin turn flat pattern.  Unclear if from cocaine use versus CAD S patient with recent stent 2 L 80 and RCA in August 2018.  Daily weights  Strict I&O's   Low salt cardiac diet  Guideline directed medical therapy accordingly.Continue home lisinopril  Continue IV lasix. Hold Coreg pending 2D echo and positive cocaine.  Follow up Cardiology further recommendation.

## 2019-04-08 NOTE — ASSESSMENT & PLAN NOTE
Patient history with recent STEMI August 2018 with FREDDY to LAD and RCA.  Patient reports compliance with aspirin,Plavix, statin.  Currently denies chest pain. Troponin elevated possible to cocaine use versus NSTEMI?  Continue aspirin Plavix lisinopril.  Hold Coreg.    2D echo ordered. Follow-up with Cardiology further recommendation.  Keep NPO.

## 2019-04-08 NOTE — ASSESSMENT & PLAN NOTE
Will continue home lisinopril seen in last cardiology note from care everywhere. Holding home coreg as above.

## 2019-04-08 NOTE — PLAN OF CARE
Problem: Diabetes Comorbidity  Goal: Blood Glucose Level Within Desired Range  Outcome: Ongoing (interventions implemented as appropriate)  Intervention: Maintain Glycemic Control     04/08/19 0256   Monitor and Manage Ketoacidosis   Glycemic Management blood glucose monitoring         Problem: Breathing Pattern Ineffective  Goal: Effective Breathing Pattern  Outcome: Ongoing (interventions implemented as appropriate)  Intervention: Promote Improved Breathing Pattern     04/08/19 0256   Support Asthma Symptom Control   Airway/Ventilation Management airway patency maintained   Prevent Additional Skin Injury   Head of Bed (HOB) HOB elevated   Promote Anxiety Reduction   Supportive Measures active listening utilized;relaxation techniques promoted;verbalization of feelings encouraged

## 2019-04-08 NOTE — ASSESSMENT & PLAN NOTE
Lab Results   Component Value Date    HGBA1C 6.8 (H) 04/07/2019   Hold home metformin. Sliding scale insulin.

## 2019-04-08 NOTE — SUBJECTIVE & OBJECTIVE
Interval History:  Reports breathing better this a.m. after IV diuretic.  Denies chest pain.    Review of Systems   Constitutional: Negative for chills and fever.   HENT: Negative for nosebleeds and tinnitus.    Eyes: Negative for photophobia and visual disturbance.   Respiratory: Positive for shortness of breath. Negative for wheezing.         Orthopnea   Cardiovascular: Negative for chest pain, palpitations and leg swelling.   Gastrointestinal: Negative for abdominal distention, abdominal pain, diarrhea, nausea and vomiting.   Genitourinary: Negative for dysuria, flank pain and hematuria.   Musculoskeletal: Negative for gait problem and joint swelling.   Skin: Negative for rash and wound.   Neurological: Negative for seizures and syncope.     Objective:     Vital Signs (Most Recent):  Temp: 98.1 °F (36.7 °C) (04/08/19 0742)  Pulse: 70 (04/08/19 0742)  Resp: 17 (04/08/19 0742)  BP: 136/79 (04/08/19 0742)  SpO2: 95 % (04/08/19 0742) Vital Signs (24h Range):  Temp:  [97.7 °F (36.5 °C)-98.5 °F (36.9 °C)] 98.1 °F (36.7 °C)  Pulse:  [64-95] 70  Resp:  [17-20] 17  SpO2:  [92 %-100 %] 95 %  BP: (117-204)/() 136/79     Weight: 127 kg (279 lb 15.8 oz)  Body mass index is 41.35 kg/m².    Intake/Output Summary (Last 24 hours) at 4/8/2019 0836  Last data filed at 4/7/2019 2100  Gross per 24 hour   Intake --   Output 1600 ml   Net -1600 ml      Physical Exam   Constitutional: She is oriented to person, place, and time. She appears well-developed and well-nourished. No distress.   HENT:   Head: Normocephalic and atraumatic.   Right Ear: External ear normal.   Left Ear: External ear normal.   Eyes: Conjunctivae and EOM are normal. Right eye exhibits no discharge. Left eye exhibits no discharge.   Neck: Normal range of motion. JVD (minimal jvd) present. No thyromegaly present.   Cardiovascular: Normal rate and regular rhythm.   No murmur heard.  Pulmonary/Chest: Effort normal. No respiratory distress. She has rales (few  rales at bases).   Abdominal: Soft. Bowel sounds are normal. She exhibits no distension and no mass. There is no tenderness.   Musculoskeletal: She exhibits no edema or deformity.   Neurological: She is alert and oriented to person, place, and time.   Skin: Skin is warm and dry.   Psychiatric: She has a normal mood and affect. Her behavior is normal.   Nursing note and vitals reviewed.      Significant Labs: All pertinent labs within the past 24 hours have been reviewed.    Significant Imaging: I have reviewed and interpreted all pertinent imaging results/findings within the past 24 hours.

## 2019-04-08 NOTE — CONSULTS
[facility]  Cardiology  Consult Note    Patient Name: Jade Zuñiga  MRN: [id number]  Admission Date: 2019  Hospital Length of Stay: 1 days  Code Status: Full Code   Attending Provider: Adelina Baeza MD   Consulting Provider: Joseph Donato MD  Primary Care Physician: Santiago Knox MD  Principal Problem:Acute on chronic systolic (congestive) heart failure    Patient information was obtained from patient, past medical records and ER records.     Inpatient consult to Cardiology  Consult performed by: Joseph Donato MD  Consult ordered by: Sergio Bray PA-C  Reason for consult: Chest pain        Subjective:     Chief Complaint:  Chest pain     HPI:   54 y/o/f with a hx of smoking and MI presents with SOB that started today around 5 am. She states she has had similar symptoms prior but today was the worse that she has experienced . SOB worse with walking or laying down. Also experiencing a dry cough.  Denies fever, sore throat, nasal congestion, recent travel, recent surgery, swelling to legs, N/V/D, dizziness, abdominal pain or CP.  No pain currently    Presented with acute MI to [facility] last year and underwent multivessel PCI the LAD and RCA.  She presents with U tox positive for cocaine and chest pain dissimilar to her previous MI presentation.  She currently is pain-free.  She has mild troponin elevation with in a flat pattern.  She denies any other associated symptoms.  She says she has been compliant with her medicines including anti-platelet therapy.  She still smoking as well.  She denies any PND, orthopnea or lower extremity edema.  She has not experienced any dizziness, presyncope or syncope.    Past Medical History:   Diagnosis Date    Arthritis     CHF (congestive heart failure)     Diabetes mellitus     boderline    High cholesterol     MI (myocardial infarction)        Past Surgical History:   Procedure Laterality Date     SECTION, LOW  TRANSVERSE         Review of patient's allergies indicates:  No Known Allergies    No current facility-administered medications on file prior to encounter.      Current Outpatient Medications on File Prior to Encounter   Medication Sig    metformin (GLUCOPHAGE-XR) 500 MG 24 hr tablet Take 500 mg by mouth daily with breakfast.    pravastatin (PRAVACHOL) 20 MG tablet Take 20 mg by mouth once daily.    gabapentin (NEURONTIN) 100 MG capsule Take 100 mg by mouth 3 (three) times daily.    ibuprofen (ADVIL,MOTRIN) 600 MG tablet Take 1 tablet (600 mg total) by mouth every 6 (six) hours as needed for Pain. Take with food to prevent stomach ulcer/bleed    lovastatin (MEVACOR) 20 MG tablet Take 20 mg by mouth every evening.    meloxicam (MOBIC) 15 MG tablet Take 1 tablet (15 mg total) by mouth once daily.    methocarbamol (ROBAXIN) 750 MG Tab Take 2 tablets (1,500 mg total) by mouth every 8 (eight) hours as needed (spasms).    oxycodone-acetaminophen (PERCOCET) 5-325 mg per tablet Take 1 tablet by mouth every 4 (four) hours as needed for Pain.    promethazine-phenylephrine (PROMETHAZINE VC) 6.25-5 mg/5 mL Syrp Take by mouth.     Family History     Problem Relation (Age of Onset)    Cancer Mother    Diabetes Mother        Tobacco Use    Smoking status: Current Every Day Smoker     Packs/day: 0.25    Smokeless tobacco: Never Used   Substance and Sexual Activity    Alcohol use: No     Comment: social    Drug use: No    Sexual activity: Yes     Partners: Male     Review of Systems   Constitution: Negative.   HENT: Negative.    Eyes: Negative.    Cardiovascular: Positive for chest pain. Negative for dyspnea on exertion, irregular heartbeat, leg swelling, near-syncope, orthopnea, palpitations, paroxysmal nocturnal dyspnea and syncope.   Respiratory: Positive for shortness of breath.    Skin: Negative.    Musculoskeletal: Negative.    Gastrointestinal: Negative for abdominal pain, constipation and diarrhea.    Genitourinary: Negative for dysuria.   Neurological: Negative.    Psychiatric/Behavioral: Negative.      Objective:     Vital Signs (Most Recent):  Temp: 98.1 °F (36.7 °C) (04/08/19 0742)  Pulse: 70 (04/08/19 0742)  Resp: 17 (04/08/19 0742)  BP: 136/79 (04/08/19 0742)  SpO2: 95 % (04/08/19 0742) Vital Signs (24h Range):  Temp:  [97.7 °F (36.5 °C)-98.5 °F (36.9 °C)] 98.1 °F (36.7 °C)  Pulse:  [64-95] 70  Resp:  [17-20] 17  SpO2:  [92 %-100 %] 95 %  BP: (117-204)/() 136/79     Weight: 127 kg (279 lb 15.8 oz)  Body mass index is 41.35 kg/m².    SpO2: 95 %  O2 Device (Oxygen Therapy): nasal cannula      Intake/Output Summary (Last 24 hours) at 4/8/2019 1102  Last data filed at 4/8/2019 0911  Gross per 24 hour   Intake --   Output 2500 ml   Net -2500 ml       Lines/Drains/Airways     Peripheral Intravenous Line                 Peripheral IV - Single Lumen 04/07/19 1210 Right Antecubital less than 1 day                Physical Exam   Constitutional: She is oriented to person, place, and time. She appears well-developed and well-nourished.   HENT:   Head: Normocephalic and atraumatic.   Eyes: Pupils are equal, round, and reactive to light. Conjunctivae and EOM are normal.   Neck: Normal range of motion. Neck supple. No thyromegaly present.   Cardiovascular: Normal rate and regular rhythm.   No murmur heard.  Pulmonary/Chest: Effort normal and breath sounds normal. No respiratory distress.   Abdominal: Soft. Bowel sounds are normal.   Musculoskeletal: She exhibits no edema.   Neurological: She is alert and oriented to person, place, and time.   Skin: Skin is warm and dry.   Psychiatric: She has a normal mood and affect. Her behavior is normal.       Significant Labs:   CMP   Recent Labs   Lab 04/07/19  1729 04/08/19  0536    145   K 3.5 3.5    109   CO2 30* 28   * 125*   BUN 14 15   CREATININE 0.9 0.8   CALCIUM 9.3 9.0   PROT 7.0  --    ALBUMIN 3.8  --    BILITOT 0.3  --    ALKPHOS 89  --    AST  14  --    ALT 15  --    ANIONGAP 6* 8   ESTGFRAFRICA >60 >60   EGFRNONAA >60 >60   , CBC   Recent Labs   Lab 04/07/19  1729 04/08/19  0534   WBC 5.12 5.36   HGB 11.6* 11.3*   HCT 34.6* 33.3*    247   , INR No results for input(s): INR, PROTIME in the last 48 hours., Lipid Panel   Recent Labs   Lab 04/07/19  1729   CHOL 256*   HDL 53   LDLCALC 178.6*   TRIG 122   CHOLHDL 20.7    and Troponin   Recent Labs   Lab 04/07/19  1729 04/07/19  2303 04/08/19  0536   TROPONINI 0.206* 0.196* 0.207*       Significant Imaging: EKG: Normal sinus rhythm with nonspecific ST-T changes    Assessment and Plan:     * Acute on chronic systolic (congestive) heart failure  Back to baseline post diuresis  Continue medicines for GDMT  Follow-up echocardiogram    Elevated troponin  Likely secondary to demand with flat pattern minimal elevation  Plan for ischemic workup with NST today in light of recent multivessel PCI with continued tobacco use disorder    Coronary artery disease involving native coronary artery without angina pectoris  Continue medicines for secondary prevention as tolerated    Essential hypertension  Stable    Diabetes mellitus, type 2  Management per IM    Hyperlipidemia  On statin    Tobacco abuse  Counseled    Cocaine abuse  Counseled        VTE Risk Mitigation (From admission, onward)        Ordered     IP VTE HIGH RISK PATIENT  Once      04/07/19 4718          Thank you for your consult. I will follow-up with patient. Please contact us if you have any additional questions.    Joseph Donato MD  Cardiology   [facility]

## 2019-04-08 NOTE — SUBJECTIVE & OBJECTIVE
Past Medical History:   Diagnosis Date    Arthritis     CHF (congestive heart failure)     Diabetes mellitus     boderline    High cholesterol     MI (myocardial infarction)        Past Surgical History:   Procedure Laterality Date     SECTION, LOW TRANSVERSE         Review of patient's allergies indicates:  No Known Allergies    No current facility-administered medications on file prior to encounter.      Current Outpatient Medications on File Prior to Encounter   Medication Sig    metformin (GLUCOPHAGE-XR) 500 MG 24 hr tablet Take 500 mg by mouth daily with breakfast.    pravastatin (PRAVACHOL) 20 MG tablet Take 20 mg by mouth once daily.    gabapentin (NEURONTIN) 100 MG capsule Take 100 mg by mouth 3 (three) times daily.    ibuprofen (ADVIL,MOTRIN) 600 MG tablet Take 1 tablet (600 mg total) by mouth every 6 (six) hours as needed for Pain. Take with food to prevent stomach ulcer/bleed    lovastatin (MEVACOR) 20 MG tablet Take 20 mg by mouth every evening.    meloxicam (MOBIC) 15 MG tablet Take 1 tablet (15 mg total) by mouth once daily.    methocarbamol (ROBAXIN) 750 MG Tab Take 2 tablets (1,500 mg total) by mouth every 8 (eight) hours as needed (spasms).    oxycodone-acetaminophen (PERCOCET) 5-325 mg per tablet Take 1 tablet by mouth every 4 (four) hours as needed for Pain.    promethazine-phenylephrine (PROMETHAZINE VC) 6.25-5 mg/5 mL Syrp Take by mouth.     Family History     Problem Relation (Age of Onset)    Cancer Mother    Diabetes Mother        Tobacco Use    Smoking status: Current Every Day Smoker     Packs/day: 0.25    Smokeless tobacco: Never Used   Substance and Sexual Activity    Alcohol use: No     Comment: social    Drug use: No    Sexual activity: Yes     Partners: Male     Review of Systems   Constitution: Negative.   HENT: Negative.    Eyes: Negative.    Cardiovascular: Positive for chest pain. Negative for dyspnea on exertion, irregular heartbeat, leg swelling,  near-syncope, orthopnea, palpitations, paroxysmal nocturnal dyspnea and syncope.   Respiratory: Positive for shortness of breath.    Skin: Negative.    Musculoskeletal: Negative.    Gastrointestinal: Negative for abdominal pain, constipation and diarrhea.   Genitourinary: Negative for dysuria.   Neurological: Negative.    Psychiatric/Behavioral: Negative.      Objective:     Vital Signs (Most Recent):  Temp: 98.1 °F (36.7 °C) (04/08/19 0742)  Pulse: 70 (04/08/19 0742)  Resp: 17 (04/08/19 0742)  BP: 136/79 (04/08/19 0742)  SpO2: 95 % (04/08/19 0742) Vital Signs (24h Range):  Temp:  [97.7 °F (36.5 °C)-98.5 °F (36.9 °C)] 98.1 °F (36.7 °C)  Pulse:  [64-95] 70  Resp:  [17-20] 17  SpO2:  [92 %-100 %] 95 %  BP: (117-204)/() 136/79     Weight: 127 kg (279 lb 15.8 oz)  Body mass index is 41.35 kg/m².    SpO2: 95 %  O2 Device (Oxygen Therapy): nasal cannula      Intake/Output Summary (Last 24 hours) at 4/8/2019 1102  Last data filed at 4/8/2019 0911  Gross per 24 hour   Intake --   Output 2500 ml   Net -2500 ml       Lines/Drains/Airways     Peripheral Intravenous Line                 Peripheral IV - Single Lumen 04/07/19 1210 Right Antecubital less than 1 day                Physical Exam   Constitutional: She is oriented to person, place, and time. She appears well-developed and well-nourished.   HENT:   Head: Normocephalic and atraumatic.   Eyes: Pupils are equal, round, and reactive to light. Conjunctivae and EOM are normal.   Neck: Normal range of motion. Neck supple. No thyromegaly present.   Cardiovascular: Normal rate and regular rhythm.   No murmur heard.  Pulmonary/Chest: Effort normal and breath sounds normal. No respiratory distress.   Abdominal: Soft. Bowel sounds are normal.   Musculoskeletal: She exhibits no edema.   Neurological: She is alert and oriented to person, place, and time.   Skin: Skin is warm and dry.   Psychiatric: She has a normal mood and affect. Her behavior is normal.       Significant  Labs:   CMP   Recent Labs   Lab 04/07/19  1729 04/08/19  0536    145   K 3.5 3.5    109   CO2 30* 28   * 125*   BUN 14 15   CREATININE 0.9 0.8   CALCIUM 9.3 9.0   PROT 7.0  --    ALBUMIN 3.8  --    BILITOT 0.3  --    ALKPHOS 89  --    AST 14  --    ALT 15  --    ANIONGAP 6* 8   ESTGFRAFRICA >60 >60   EGFRNONAA >60 >60   , CBC   Recent Labs   Lab 04/07/19  1729 04/08/19  0534   WBC 5.12 5.36   HGB 11.6* 11.3*   HCT 34.6* 33.3*    247   , INR No results for input(s): INR, PROTIME in the last 48 hours., Lipid Panel   Recent Labs   Lab 04/07/19  1729   CHOL 256*   HDL 53   LDLCALC 178.6*   TRIG 122   CHOLHDL 20.7    and Troponin   Recent Labs   Lab 04/07/19  1729 04/07/19  2303 04/08/19  0536   TROPONINI 0.206* 0.196* 0.207*       Significant Imaging: EKG: Normal sinus rhythm with nonspecific ST-T changes

## 2019-04-08 NOTE — PROGRESS NOTES
TN taught Symptoms and Problems for CHF home care with pt and significant/Eleazar Bennett with teach back:  1. Increase number of pillows under pt head, 2. Swelling of ankles and or feet . TN placed education sheet in [employer] Packet..     Help at home will be from significant other, Chaim Bennett, assisting in pt's recovery.     TN taught patient and significant other about things she is responsible for when discharged TO HELP WITH her RECOVERY:  Particularly on how to Manage her  Care At Home:  1. Getting his prescriptions filled.  2. Taking his medications as directed. DO NOT MISS ANY DOSES!  3. Going to his follow-up doctor appointments.   .  Maryann Mariee, RN, BSN, STN CCM

## 2019-04-08 NOTE — HPI
52 y/o/f with a hx of smoking and MI presents with SOB that started today around 5 am. She states she has had similar symptoms prior but today was the worse that she has experienced . SOB worse with walking or laying down. Also experiencing a dry cough.  Denies fever, sore throat, nasal congestion, recent travel, recent surgery, swelling to legs, N/V/D, dizziness, abdominal pain or CP.  No pain currently    Presented with acute MI to [facility] last year and underwent multivessel PCI the LAD and RCA.  She presents with U tox positive for cocaine and chest pain dissimilar to her previous MI presentation.  She currently is pain-free.  She has mild troponin elevation with in a flat pattern.  She denies any other associated symptoms.  She says she has been compliant with her medicines including anti-platelet therapy.  She still smoking as well.  She denies any PND, orthopnea or lower extremity edema.  She has not experienced any dizziness, presyncope or syncope.

## 2019-04-08 NOTE — PLAN OF CARE
04/08/19 1008   Discharge Assessment   Assessment Type Discharge Planning Assessment   Confirmed/corrected address and phone number on facesheet? Yes   Assessment information obtained from? Patient;Medical Record   Prior to hospitilization cognitive status: Alert/Oriented   Prior to hospitalization functional status: Independent   Current cognitive status: Alert/Oriented   Current Functional Status: Independent   Facility Arrived From: home   Lives With alone   Able to Return to Prior Arrangements yes   Is patient able to care for self after discharge? Yes   Who are your caregiver(s) and their phone number(s)? sister Edmar [phone]    Patient's perception of discharge disposition home or selfcare   Readmission Within the Last 30 Days no previous admission in last 30 days   Patient currently being followed by outpatient case management? No   Patient currently receives any other outside agency services? No   Equipment Currently Used at Home cane, straight;glucometer   Do you have any problems affording any of your prescribed medications? No   Is the patient taking medications as prescribed? no   If no, which medications is patient not taking? pt has a history of compliance  (unknown )   Does the patient have transportation home?   (TBD)   Does the patient receive services at the [facility]? No   Discharge Plan A Home   Discharge Plan B Home  (with follow up appointent)   DME Needed Upon Discharge  none   Patient/Family in Agreement with Plan yes

## 2019-04-08 NOTE — NURSING
Pt discharge teaching was implemented along with paperwork. D/c IV site, catheter intact. Pt AAOx4 VS were stable. Pt verbalizes understanding and no questions were asked. PCT escorted pt for d/c.

## 2019-04-08 NOTE — ASSESSMENT & PLAN NOTE
Likely secondary to demand with flat pattern minimal elevation  Plan for ischemic workup with NST today in light of recent multivessel PCI with continued tobacco use disorder

## 2019-04-08 NOTE — PROGRESS NOTES
Call placed to [facility] to schedule follow up appointment with pts cardiologist, Dr DIANN Vegas.  Appointment scheduled and follow up entered into follow up tab to print on AVS at time of discharge.

## 2019-04-08 NOTE — PROGRESS NOTES
WRITTEN HEALTHCARE DISCHARGE INFORMATION      Things that YOU are responsible for to Manage Your Care At Home:  1. Getting your prescriptions filled.  2. Taking you medications as directed. DO NOT MISS ANY DOSES!  3. Going to your follow-up doctor appointments. This is important because it allows the doctor to monitor your progress and to determine if any changes need to be made to your treatment plan.     If you are unable to make your follow up appointments, please call the number listed and reschedule this appointment.      After discharge, if you need assistance, you can call [facility] Line for 24/7 assistance at [phone]     If you are experience any signs or symptoms, Call your doctor or Call 911 and come to your nearest Emergency Room.     Thank you for choosing [facility] and allowing us to care for you.        You should receive a call from [facility] within 48-72 hours to help manage your care after discharge. Please try to make sure that you answer your phone for this important phone call.     Follow-up Information     Mckenna Vegas MD On 4/23/2019.    Specialty:  Internal Medicine  Why:  Appointment scheduled for Tuesday April 23rd at 1pm  Contact information:  [address] [facility]  [address]  [place] [facility] [place] [address]  [phone]             Santiago Knox MD.    Specialty:  General Practice  Why:  call to schedule follow up appointment as needed  Contact information:  [address]  [place] [address]  [phone]

## 2019-04-09 NOTE — DISCHARGE SUMMARY
"[facility] Medicine  Discharge Summary      Patient Name: Jade Zuñiga  MRN: [id number]  Admission Date: 4/7/2019  Hospital Length of Stay: 1 days  Discharge Date and Time: 4/8/19  Attending Physician: No att. providers found   Discharging Provider: Denise Cha NP  Primary Care Provider: Santiago Knox MD      HPI:   Jade Zuñiga 53 y.o. female with CAD, HTN, HLD, DM2, and cocaine abuse presents to the hospital with a chief complaint of SOB. She reports she awoke this morning with severe SOB. She had been noting over the last week increasing SOB, but today became unbearable causing presentation to the hospital. The SOB is worsened with walking and laying flat. She reports laying flat she felt as though she was smothering. Her SOB is improved now after lasix provided by the ED. She noticed she has been gaining weight and contributed that to the SOB. She also has been eating fried chicken and feels that played a role. She reports she stopped cocaine use after previous MI, however, used cocaine 2 days ago. These symptoms do not feel similar to previous MI. She is compliant with aspirin, plavix, statin, and coreg. She is unsure of if she takes other BP medications. She endorses SOB and orthopnea. She denies fever, chest pain, leg swelling, N/V/D, abdominal pain, dysuria, and syncope. She also reports she smokes 3 cigarettes a day.     [facility] chart review  8/2018 "08/2018 PATIENT ADMITTED, DIAGNOSED WITH ACUTE ANTERIOR STEMI WITH CARDIOGENIC SHOCK. URINE FOUND POSITIVE FOR COCAINE. PATIENT WITH VENTRICULAR ECTOPY, HYPOKALEMIA. SHE WAS FEBRILE. GPC BETA HEMOLYTIC STREP AND STAPH AUREUS BACTEREMIA. DORYS NEGATIVE FOR ENDOCARDITIS. SHE UNDERWENT LEFT HEART CATH WITH PTCA AND STENTING TO LAD AND RCA. BARE METAL WAS PLACED TO THE LAD AND DRUG ELUTING STENT TO THE RCA. SHE HAD RIGHT LEG PAIN. SHE DID HAVE A BALLOON PUMP IN PLACE. THEY DID DO A CT SCAN TO EVALUATE FOR INFECTION WHICH WAS " NEGATIVE AND A VENOUS ULTRASOUND WHICH WAS NEGATIVE FOR DVT. ECHO REVEALED NO EVIDENCE OF VEGETATION AND EF WAS 45-50%. HEART CATH REVEALED NO SIGNIFICANT DISEASE IN THE LEFT MAIN, TOTALLY OCCLUDED LAD IN THE PROXIMAL PORTION. CIRCUMFLEX NO SIGNIFICANT DISEASE, RCA DOMINANT, TOTAL OCCLUSION IN THE PROXIMAL PORTION.    In the ED, troponin 0.18, EKG of NSR without ST segment elevation, chest x-ray with signs of edema, D-dimer of 1000, CTA without PE and findings suggesting edema, and BNP of 434.     * No surgery found *      Hospital Course:   Jade Zuñiga 53 y.o. female admitted to observation for shortness of breath felt due to acute on chronic diastolic heart failure vs NSTEMI. In the ED, troponin 0.18, EKG of NSR without ST segment elevation, chest x-ray with signs of edema, D-dimer of 1000, CTA without PE and findings suggesting edema, and BNP of 434. CT also showed possible nodule versus atelectasis within the left lower lobe and Radiology recommended 1 year follow-up. Urine tox positive for cocaine (last use 2 days ago).  Patient with history of STEMI August 2018 hospitalized at [facility] when patient had stent to RCA and LAD.  Symptoms improved with IV Lasix.  Urine output not all charted in epic.  On 04/08/2019 stress tests showed large amount of severe infarct in the anteroapical walls and inferior walls felt to be old infarcts.  2D showed EF of 55%, grade 3 left ventricular diastolic dysfunction, and wall motion normal. Patient remained stable without chest pain or shortness of breath throughout observation stay.  Patient stable for discharge encouraged patient to stop smoking and using cocaine.  Patient to follow up with primary cardiologist as scheduled.     Consults:   Consults (From admission, onward)        Status Ordering Provider     Inpatient consult to Cardiology  Once     Provider:  Joseph Donato MD    Completed SHOWMUARILIO ADAMS     Inpatient consult to Social Work  Once      Provider:  (Not yet assigned)    MAURILIO Ayala            Final Active Diagnoses:    Diagnosis Date Noted POA    PRINCIPAL PROBLEM:  Acute on chronic systolic (congestive) heart failure [I50.23] 04/07/2019 Yes    Coronary artery disease involving native coronary artery without angina pectoris [I25.10] 04/08/2019 Yes    Elevated troponin [R74.8] 04/07/2019 Yes    Diabetes mellitus, type 2 [E11.9] 04/07/2019 Yes    Hyperlipidemia [E78.5] 04/07/2019 Yes    Essential hypertension [I10] 04/07/2019 Yes    Tobacco abuse [Z72.0] 04/07/2019 Yes    Cocaine abuse [F14.10] 04/07/2019 Yes      Problems Resolved During this Admission:       Discharged Condition: good    Disposition: Home or Self Care    Follow Up:  Follow-up Information     Mckenna Vegas MD On 4/23/2019.    Specialty:  Internal Medicine  Why:  Appointment scheduled for Tuesday April 23rd at 1pm  Contact information:  [address]  [address]  [facility] [id number] [phone]             Santiago Knox MD.    Specialty:  General Practice  Why:  call to schedule follow up appointment as needed  Contact information:  [address] [facility] [id number] [phone]                 Patient Instructions:      Diet Cardiac     Diet diabetic     Notify your health care provider if you experience any of the following:  temperature >100.4     Notify your health care provider if you experience any of the following:  difficulty breathing or increased cough     Notify your health care provider if you experience any of the following:  increased confusion or weakness     Activity as tolerated         Pending Diagnostic Studies:     None         Medications:  Reconciled Home Medications:      Medication List      START taking these medications    aspirin 81 MG Chew  Take 1 tablet (81 mg total) by mouth once daily.     clopidogrel 75 mg tablet  Commonly known as:  PLAVIX  Take 1 tablet (75 mg total) by mouth once daily.      lisinopril 5 MG tablet  Commonly known as:  PRINIVIL,ZESTRIL  Take 1 tablet (5 mg total) by mouth once daily.        CONTINUE taking these medications    gabapentin 100 MG capsule  Commonly known as:  NEURONTIN  Take 100 mg by mouth 3 (three) times daily.     lovastatin 20 MG tablet  Commonly known as:  MEVACOR  Take 20 mg by mouth every evening.     metFORMIN 500 MG 24 hr tablet  Commonly known as:  GLUCOPHAGE-XR  Take 500 mg by mouth daily with breakfast.     methocarbamol 750 MG Tab  Commonly known as:  ROBAXIN  Take 2 tablets (1,500 mg total) by mouth every 8 (eight) hours as needed (spasms).     oxyCODONE-acetaminophen 5-325 mg per tablet  Commonly known as:  PERCOCET  Take 1 tablet by mouth every 4 (four) hours as needed for Pain.     pravastatin 20 MG tablet  Commonly known as:  PRAVACHOL  Take 20 mg by mouth once daily.     PROMETHAZINE VC 6.25-5 mg/5 mL Syrp  Generic drug:  promethazine-phenylephrine  Take by mouth.        STOP taking these medications    ibuprofen 600 MG tablet  Commonly known as:  ADVIL,MOTRIN     meloxicam 15 MG tablet  Commonly known as:  MOBIC            Indwelling Lines/Drains at time of discharge:   Lines/Drains/Airways          None          Time spent on the discharge of patient: 30 minutes  Patient was seen and examined on the date of discharge and determined to be suitable for discharge.         Denise Cha NP  Department of Hospital Medicine  [facility]

## 2021-06-26 ENCOUNTER — IMMUNIZATION (OUTPATIENT)
Dept: PRIMARY CARE CLINIC | Facility: CLINIC | Age: 56
End: 2021-06-26
Payer: MEDICAID

## 2021-06-26 DIAGNOSIS — Z23 NEED FOR VACCINATION: Primary | ICD-10-CM

## 2021-06-26 PROCEDURE — 0001A COVID-19, MRNA, LNP-S, PF, 30 MCG/0.3 ML DOSE VACCINE: CPT | Mod: CV19,S$GLB,, | Performed by: INTERNAL MEDICINE

## 2021-06-26 PROCEDURE — 91300 COVID-19, MRNA, LNP-S, PF, 30 MCG/0.3 ML DOSE VACCINE: CPT | Mod: S$GLB,,, | Performed by: INTERNAL MEDICINE

## 2021-06-26 PROCEDURE — 91300 COVID-19, MRNA, LNP-S, PF, 30 MCG/0.3 ML DOSE VACCINE: ICD-10-PCS | Mod: S$GLB,,, | Performed by: INTERNAL MEDICINE

## 2021-06-26 PROCEDURE — 0001A COVID-19, MRNA, LNP-S, PF, 30 MCG/0.3 ML DOSE VACCINE: ICD-10-PCS | Mod: CV19,S$GLB,, | Performed by: INTERNAL MEDICINE

## 2021-07-17 ENCOUNTER — IMMUNIZATION (OUTPATIENT)
Dept: PRIMARY CARE CLINIC | Facility: CLINIC | Age: 56
End: 2021-07-17
Payer: MEDICAID

## 2021-07-17 DIAGNOSIS — Z23 NEED FOR VACCINATION: Primary | ICD-10-CM

## 2021-07-17 PROCEDURE — 91300 COVID-19, MRNA, LNP-S, PF, 30 MCG/0.3 ML DOSE VACCINE: ICD-10-PCS | Mod: S$GLB,,, | Performed by: INTERNAL MEDICINE

## 2021-07-17 PROCEDURE — 0002A COVID-19, MRNA, LNP-S, PF, 30 MCG/0.3 ML DOSE VACCINE: CPT | Mod: CV19,S$GLB,, | Performed by: INTERNAL MEDICINE

## 2021-07-17 PROCEDURE — 0002A COVID-19, MRNA, LNP-S, PF, 30 MCG/0.3 ML DOSE VACCINE: ICD-10-PCS | Mod: CV19,S$GLB,, | Performed by: INTERNAL MEDICINE

## 2021-07-17 PROCEDURE — 91300 COVID-19, MRNA, LNP-S, PF, 30 MCG/0.3 ML DOSE VACCINE: CPT | Mod: S$GLB,,, | Performed by: INTERNAL MEDICINE

## 2023-05-18 NOTE — ED NOTES
Patient identifiers have been checked and are correct.      LOC: The patient is awake, alert, and aware of environment. The patient is oriented x 3 and speaking appropriately.   APPEARANCE: No acute distress noted.   PSYCHOSOCIAL: Patient is calm and cooperative.   SKIN: The skin is warm, dry.  RESPIRATORY: Airway is open and patent. Bilateral chest rise and fall. Respirations are spontaneous, even and unlabored. Normal effort and rate noted. No accessory muscle use noted.   MUSCULOSKELETAL: No obvious deformities noted. C/o pain to right knee, leg, and buttock.           This is a surgical and/or non-medical patient.

## 2023-07-29 ENCOUNTER — HOSPITAL ENCOUNTER (EMERGENCY)
Facility: HOSPITAL | Age: 58
Discharge: HOME | End: 2023-07-29
Attending: INTERNAL MEDICINE
Payer: MEDICAID

## 2023-07-29 VITALS
BODY MASS INDEX: 39.87 KG/M2 | OXYGEN SATURATION: 95 % | SYSTOLIC BLOOD PRESSURE: 165 MMHG | RESPIRATION RATE: 17 BRPM | DIASTOLIC BLOOD PRESSURE: 85 MMHG | TEMPERATURE: 98 F | HEART RATE: 66 BPM | WEIGHT: 270 LBS

## 2023-07-29 DIAGNOSIS — J02.9 VIRAL PHARYNGITIS: Primary | ICD-10-CM

## 2023-07-29 DIAGNOSIS — M54.32 BILATERAL SCIATICA: ICD-10-CM

## 2023-07-29 DIAGNOSIS — M54.31 BILATERAL SCIATICA: ICD-10-CM

## 2023-07-29 LAB — POC RAPID STREP A: NEGATIVE

## 2023-07-29 PROCEDURE — 63600175 PHARM REV CODE 636 W HCPCS: Mod: ER | Performed by: INTERNAL MEDICINE

## 2023-07-29 PROCEDURE — 99284 EMERGENCY DEPT VISIT MOD MDM: CPT | Mod: ER

## 2023-07-29 PROCEDURE — 96372 THER/PROPH/DIAG INJ SC/IM: CPT | Performed by: INTERNAL MEDICINE

## 2023-07-29 RX ORDER — KETOROLAC TROMETHAMINE 30 MG/ML
60 INJECTION, SOLUTION INTRAMUSCULAR; INTRAVENOUS
Status: COMPLETED | OUTPATIENT
Start: 2023-07-29 | End: 2023-07-29

## 2023-07-29 RX ORDER — GABAPENTIN 300 MG/1
300 CAPSULE ORAL NIGHTLY
Qty: 30 CAPSULE | Refills: 0 | Status: SHIPPED | OUTPATIENT
Start: 2023-07-29 | End: 2023-08-28

## 2023-07-29 RX ORDER — AZELASTINE 1 MG/ML
2 SPRAY, METERED NASAL 2 TIMES DAILY
Qty: 30 ML | Refills: 0 | Status: SHIPPED | OUTPATIENT
Start: 2023-07-29 | End: 2023-09-22

## 2023-07-29 RX ORDER — FLUTICASONE PROPIONATE 50 MCG
2 SPRAY, SUSPENSION (ML) NASAL DAILY
Qty: 15 G | Refills: 0 | Status: SHIPPED | OUTPATIENT
Start: 2023-07-29

## 2023-07-29 RX ORDER — DEXAMETHASONE SODIUM PHOSPHATE 4 MG/ML
8 INJECTION, SOLUTION INTRA-ARTICULAR; INTRALESIONAL; INTRAMUSCULAR; INTRAVENOUS; SOFT TISSUE
Status: COMPLETED | OUTPATIENT
Start: 2023-07-29 | End: 2023-07-29

## 2023-07-29 RX ADMIN — DEXAMETHASONE SODIUM PHOSPHATE 8 MG: 4 INJECTION INTRA-ARTICULAR; INTRALESIONAL; INTRAMUSCULAR; INTRAVENOUS; SOFT TISSUE at 10:07

## 2023-07-29 RX ADMIN — KETOROLAC TROMETHAMINE 60 MG: 30 INJECTION, SOLUTION INTRAMUSCULAR; INTRAVENOUS at 10:07

## 2023-07-30 NOTE — ED PROVIDER NOTES
"Encounter Date: 2023    SCRIBE #1 NOTE: I, Saroj Gutierrez, am scribing for, and in the presence of,  Tejas Russell MD. I have scribed the following portions of the note - Other sections scribed: HPI,ROS,PE.       History     Chief Complaint   Patient presents with    Sore Throat     Sore throat x 2 weeks. +hoarness     Leg Pain     Bilaterally leg pain that radiates down to feet. Precribed flexeril, reports no relief.      Jade Zuñiga is a 57 y.o. female, with a PMHx of diabetes mellitus, CHF and MI, who presents to the ED with bilateral leg pain and sore throat onset 2 weeks ago. Patient reports that she has pain that radiates "up and down" her bilateral legs. Patient reports that she was seen at another facility for the same symptoms and was prescribed flexeril with no alleviation which is what brought her into the ED today. Patient states that she did not take the medication given to her for her throat because she was worried about how it would affect her CHF. Patient endorses smoking tobacco. No other exacerbating or alleviating factors.     The history is provided by the patient. No  was used.     Review of patient's allergies indicates:  No Known Allergies  Past Medical History:   Diagnosis Date    Arthritis     CHF (congestive heart failure)     Diabetes mellitus     boderline    High cholesterol     MI (myocardial infarction)      Past Surgical History:   Procedure Laterality Date     SECTION, LOW TRANSVERSE       Family History   Problem Relation Age of Onset    Cancer Mother     Diabetes Mother      Social History     Tobacco Use    Smoking status: Every Day     Current packs/day: 0.25     Types: Cigarettes    Smokeless tobacco: Never   Substance Use Topics    Alcohol use: No     Comment: social    Drug use: No     Review of Systems   Constitutional:  Negative for fever.   HENT:  Positive for sore throat.    Respiratory:  Negative for shortness of breath.  "   Cardiovascular:  Negative for chest pain.   Gastrointestinal:  Negative for abdominal pain, diarrhea, nausea and vomiting.   Genitourinary:  Negative for dysuria.   Musculoskeletal:  Positive for myalgias (Bilateral legs). Negative for back pain.   Skin:  Negative for rash.   Neurological:  Negative for weakness and headaches.   Psychiatric/Behavioral:  Negative for behavioral problems.    All other systems reviewed and are negative.      Physical Exam     Initial Vitals [07/29/23 1951]   BP Pulse Resp Temp SpO2   (!) 164/80 72 17 97.8 °F (36.6 °C) 97 %      MAP       --         Physical Exam    Nursing note and vitals reviewed.  Constitutional: She appears well-developed and well-nourished.   HENT:   Head: Normocephalic and atraumatic.   Post nasal drip present with posterior oropharyngeal erythema and no exudate or edema.   Eyes: Conjunctivae are normal.   Neck: Neck supple.   Normal range of motion.  Cardiovascular:  Normal rate, regular rhythm and normal heart sounds.     Exam reveals no gallop and no friction rub.       No murmur heard.  Pulmonary/Chest: Breath sounds normal. No respiratory distress. She has no wheezes. She has no rhonchi. She has no rales.   Abdominal: Abdomen is soft. There is no abdominal tenderness.   Musculoskeletal:         General: No edema. Normal range of motion.      Cervical back: Normal range of motion and neck supple.      Lumbar back: Tenderness (radiates to bilateral lower extremities) present.      Comments: Bilateral lower extremities neurovascularly intact.     Neurological: She is alert and oriented to person, place, and time. GCS score is 15. GCS eye subscore is 4. GCS verbal subscore is 5. GCS motor subscore is 6.   Skin: Skin is warm and dry.   Psychiatric: She has a normal mood and affect.         ED Course   Procedures  Labs Reviewed   POCT STREP A MOLECULAR   POCT STREP A, RAPID          Imaging Results    None          Medications   dexAMETHasone injection 8 mg (has  "no administration in time range)   ketorolac injection 60 mg (has no administration in time range)     Medical Decision Making:   History:   Old Medical Records: I decided to obtain old medical records.  Old Records Summarized: other records.       <> Summary of Records: External documents reviewed.    Initial Assessment:   Jade Zuñiga is a 57 y.o. female, with a PMHx of diabetes mellitus, CHF and MI, who presents to the ED with bilateral leg pain and sore throat onset 2 weeks ago. Patient reports that she has pain that radiates "up and down" her bilateral legs. Patient reports that she was seen at another facility for the same symptoms and was prescribed flexeril with no alleviation which is what brought her into the ED today. Patient states that she did not take the medication given to her for her throat because she was worried about how it would affect her CHF. Patient endorses smoking tobacco. No other exacerbating or alleviating factors.   Clinical Tests:   Lab Tests: Ordered and Reviewed  ED Management:  Patient was given instructions for viral pharyngitis and sciatica.  She was advised to follow-up with her primary care physician within the next week for re-evaluation/return to the emergency department if condition worsens.  Decadron/Toradol were given in the emergency department as well as prescriptions for gabapentin/Astelin/fluticasone.          Scribe Attestation:   Scribe #1: I performed the above scribed service and the documentation accurately describes the services I performed. I attest to the accuracy of the note.              This document was produced by a scribe under my direction and in my presence. I agree with the content of the note and have made any necessary edits.     Dr. Russell    07/29/2023 11:45 PM         Clinical Impression:   Final diagnoses:  [J02.9] Viral pharyngitis (Primary)  [M54.31, M54.32] Bilateral sciatica        ED Disposition Condition    Discharge Stable          ED " Prescriptions       Medication Sig Dispense Start Date End Date Auth. Provider    gabapentin (NEURONTIN) 300 MG capsule Take 1 capsule (300 mg total) by mouth every evening. 30 capsule 7/29/2023 8/28/2023 Tejas Russell MD    azelastine (ASTELIN) 137 mcg (0.1 %) nasal spray 2 sprays (274 mcg total) by Nasal route 2 (two) times daily. 30 mL 7/29/2023 9/22/2023 Tejas Russell MD    fluticasone propionate (FLONASE) 50 mcg/actuation nasal spray 2 sprays (100 mcg total) by Each Nostril route once daily. 15 g 7/29/2023 -- Tejas Russell MD          Follow-up Information       Follow up With Specialties Details Why Contact Info    Santiago Knox MD General Practice Schedule an appointment as soon as possible for a visit in 2 days For reevaluation [address] [facility]  [place] [address]  [phone]               Tejas Russell MD  07/29/23 [id number]